# Patient Record
Sex: FEMALE | Race: OTHER | Employment: OTHER | URBAN - METROPOLITAN AREA
[De-identification: names, ages, dates, MRNs, and addresses within clinical notes are randomized per-mention and may not be internally consistent; named-entity substitution may affect disease eponyms.]

---

## 2017-07-25 PROBLEM — I10 ESSENTIAL HYPERTENSION: Status: ACTIVE | Noted: 2017-07-25

## 2017-07-25 PROBLEM — R00.2 PALPITATION: Status: ACTIVE | Noted: 2017-07-25

## 2017-09-15 PROBLEM — I47.19 PAT (PAROXYSMAL ATRIAL TACHYCARDIA): Status: ACTIVE | Noted: 2017-09-15

## 2017-09-15 PROBLEM — I49.1 PAC (PREMATURE ATRIAL CONTRACTION): Status: ACTIVE | Noted: 2017-09-15

## 2017-09-15 PROBLEM — I47.1 PAT (PAROXYSMAL ATRIAL TACHYCARDIA) (HCC): Status: ACTIVE | Noted: 2017-09-15

## 2017-09-15 PROBLEM — I49.3 PVC (PREMATURE VENTRICULAR CONTRACTION): Status: ACTIVE | Noted: 2017-09-15

## 2017-11-27 ENCOUNTER — HOSPITAL ENCOUNTER (OUTPATIENT)
Dept: MAMMOGRAPHY | Age: 72
Discharge: HOME | End: 2017-11-27
Payer: MEDICARE

## 2017-11-27 DIAGNOSIS — Z12.39 BREAST CANCER SCREENING: ICD-10-CM

## 2017-11-27 PROCEDURE — G0202 SCR MAMMO BI INCL CAD: HCPCS

## 2018-03-09 PROBLEM — R00.2 PALPITATION: Chronic | Status: ACTIVE | Noted: 2017-07-25

## 2018-03-09 PROBLEM — I47.1 PAT (PAROXYSMAL ATRIAL TACHYCARDIA) (HCC): Chronic | Status: ACTIVE | Noted: 2017-09-15

## 2018-03-09 PROBLEM — I10 ESSENTIAL HYPERTENSION: Chronic | Status: ACTIVE | Noted: 2017-07-25

## 2018-03-09 PROBLEM — I47.19 PAT (PAROXYSMAL ATRIAL TACHYCARDIA): Chronic | Status: ACTIVE | Noted: 2017-09-15

## 2019-01-18 ENCOUNTER — INITIAL CONSULT (OUTPATIENT)
Dept: ONCOLOGY | Age: 74
End: 2019-01-18
Payer: MEDICARE

## 2019-01-18 ENCOUNTER — HOSPITAL ENCOUNTER (OUTPATIENT)
Age: 74
Discharge: HOME | End: 2019-01-18
Payer: MEDICARE

## 2019-01-18 VITALS
TEMPERATURE: 97.5 F | WEIGHT: 192 LBS | DIASTOLIC BLOOD PRESSURE: 91 MMHG | BODY MASS INDEX: 35.33 KG/M2 | HEART RATE: 81 BPM | SYSTOLIC BLOOD PRESSURE: 140 MMHG | HEIGHT: 62 IN

## 2019-01-18 DIAGNOSIS — D72.829 LEUKOCYTOSIS, UNSPECIFIED TYPE: Primary | ICD-10-CM

## 2019-01-18 DIAGNOSIS — D72.829 LEUKOCYTOSIS, UNSPECIFIED TYPE: ICD-10-CM

## 2019-01-18 LAB
ABSOLUTE EOS #: 0.1 K/UL (ref 0–0.4)
ABSOLUTE IMMATURE GRANULOCYTE: ABNORMAL K/UL (ref 0–0.3)
ABSOLUTE LYMPH #: 2.5 K/UL (ref 1–4.8)
ABSOLUTE MONO #: 0.8 K/UL (ref 0.1–1.2)
BASOPHILS # BLD: 1 % (ref 0–2)
BASOPHILS ABSOLUTE: 0.1 K/UL (ref 0–0.2)
DIFFERENTIAL TYPE: ABNORMAL
EOSINOPHILS RELATIVE PERCENT: 1 % (ref 1–4)
HCT VFR BLD CALC: 42.8 % (ref 36–46)
HEMOGLOBIN: 13.9 G/DL (ref 12–16)
IMMATURE GRANULOCYTES: ABNORMAL %
LACTATE DEHYDROGENASE: 211 U/L (ref 135–214)
LYMPHOCYTES # BLD: 18 % (ref 24–44)
MCH RBC QN AUTO: 29.3 PG (ref 26–34)
MCHC RBC AUTO-ENTMCNC: 32.6 G/DL (ref 31–37)
MCV RBC AUTO: 90.1 FL (ref 80–100)
MONOCYTES # BLD: 6 % (ref 2–11)
NRBC AUTOMATED: ABNORMAL PER 100 WBC
PDW BLD-RTO: 13.4 % (ref 12.5–15.4)
PLATELET # BLD: 282 K/UL (ref 140–450)
PLATELET ESTIMATE: ABNORMAL
PMV BLD AUTO: 9.8 FL (ref 6–12)
RBC # BLD: 4.76 M/UL (ref 4–5.2)
RBC # BLD: ABNORMAL 10*6/UL
SEG NEUTROPHILS: 74 % (ref 36–66)
SEGMENTED NEUTROPHILS ABSOLUTE COUNT: 10.5 K/UL (ref 1.8–7.7)
WBC # BLD: 14.1 K/UL (ref 3.5–11)
WBC # BLD: ABNORMAL 10*3/UL

## 2019-01-18 PROCEDURE — 99201 HC NEW PT, E/M LEVEL 1: CPT | Performed by: INTERNAL MEDICINE

## 2019-01-18 PROCEDURE — 85025 COMPLETE CBC W/AUTO DIFF WBC: CPT

## 2019-01-18 PROCEDURE — 86038 ANTINUCLEAR ANTIBODIES: CPT

## 2019-01-18 PROCEDURE — 81270 JAK2 GENE: CPT

## 2019-01-18 PROCEDURE — 36415 COLL VENOUS BLD VENIPUNCTURE: CPT

## 2019-01-18 PROCEDURE — 81206 BCR/ABL1 GENE MAJOR BP: CPT

## 2019-01-18 PROCEDURE — 99204 OFFICE O/P NEW MOD 45 MIN: CPT | Performed by: INTERNAL MEDICINE

## 2019-01-18 PROCEDURE — 83615 LACTATE (LD) (LDH) ENZYME: CPT

## 2019-01-21 LAB — ANTI-NUCLEAR ANTIBODY (ANA): NEGATIVE

## 2019-01-23 LAB — V617F MUTATION, QNT: 0 %

## 2019-01-25 LAB
BCR-ABL QUANTITATIVE: NOT DETECTED
BCR-ABL1, MAJOR, RATIO: 0
BCR-ABL1, PERCENT: 0 %
BCR/ABL SOURCE: NORMAL
EER BCR-ABL1, MAJOR: NORMAL

## 2019-02-15 ENCOUNTER — TELEPHONE (OUTPATIENT)
Dept: ONCOLOGY | Age: 74
End: 2019-02-15

## 2019-02-15 ENCOUNTER — OFFICE VISIT (OUTPATIENT)
Dept: ONCOLOGY | Age: 74
End: 2019-02-15
Payer: MEDICARE

## 2019-02-15 VITALS
SYSTOLIC BLOOD PRESSURE: 153 MMHG | TEMPERATURE: 97.6 F | DIASTOLIC BLOOD PRESSURE: 83 MMHG | HEART RATE: 83 BPM | WEIGHT: 191.8 LBS | BODY MASS INDEX: 35.08 KG/M2 | RESPIRATION RATE: 18 BRPM

## 2019-02-15 DIAGNOSIS — D72.825 BANDEMIA: Primary | ICD-10-CM

## 2019-02-15 PROCEDURE — 99214 OFFICE O/P EST MOD 30 MIN: CPT | Performed by: INTERNAL MEDICINE

## 2019-08-23 ENCOUNTER — TELEPHONE (OUTPATIENT)
Dept: ONCOLOGY | Age: 74
End: 2019-08-23

## 2019-08-23 ENCOUNTER — HOSPITAL ENCOUNTER (OUTPATIENT)
Age: 74
Discharge: HOME | End: 2019-08-23
Payer: MEDICARE

## 2019-08-23 ENCOUNTER — OFFICE VISIT (OUTPATIENT)
Dept: ONCOLOGY | Age: 74
End: 2019-08-23
Payer: MEDICARE

## 2019-08-23 VITALS
BODY MASS INDEX: 33.91 KG/M2 | DIASTOLIC BLOOD PRESSURE: 85 MMHG | HEART RATE: 90 BPM | SYSTOLIC BLOOD PRESSURE: 146 MMHG | WEIGHT: 185.4 LBS

## 2019-08-23 DIAGNOSIS — D72.825 BANDEMIA: Primary | ICD-10-CM

## 2019-08-23 DIAGNOSIS — D72.825 BANDEMIA: ICD-10-CM

## 2019-08-23 LAB
ABSOLUTE EOS #: 0.2 K/UL (ref 0–0.4)
ABSOLUTE IMMATURE GRANULOCYTE: ABNORMAL K/UL (ref 0–0.3)
ABSOLUTE LYMPH #: 3.9 K/UL (ref 1–4.8)
ABSOLUTE MONO #: 0.9 K/UL (ref 0.1–1.2)
BASOPHILS # BLD: 1 % (ref 0–2)
BASOPHILS ABSOLUTE: 0.1 K/UL (ref 0–0.2)
DIFFERENTIAL TYPE: ABNORMAL
EOSINOPHILS RELATIVE PERCENT: 2 % (ref 1–4)
HCT VFR BLD CALC: 41.6 % (ref 36–46)
HEMOGLOBIN: 14 G/DL (ref 12–16)
IMMATURE GRANULOCYTES: ABNORMAL %
LYMPHOCYTES # BLD: 33 % (ref 24–44)
MCH RBC QN AUTO: 30.2 PG (ref 26–34)
MCHC RBC AUTO-ENTMCNC: 33.8 G/DL (ref 31–37)
MCV RBC AUTO: 89.5 FL (ref 80–100)
MONOCYTES # BLD: 7 % (ref 2–11)
NRBC AUTOMATED: ABNORMAL PER 100 WBC
PDW BLD-RTO: 14.8 % (ref 12.5–15.4)
PLATELET # BLD: 245 K/UL (ref 140–450)
PLATELET ESTIMATE: ABNORMAL
PMV BLD AUTO: 9.8 FL (ref 6–12)
RBC # BLD: 4.64 M/UL (ref 4–5.2)
RBC # BLD: ABNORMAL 10*6/UL
SEG NEUTROPHILS: 57 % (ref 36–66)
SEGMENTED NEUTROPHILS ABSOLUTE COUNT: 6.8 K/UL (ref 1.8–7.7)
WBC # BLD: 11.9 K/UL (ref 3.5–11)
WBC # BLD: ABNORMAL 10*3/UL

## 2019-08-23 PROCEDURE — 99211 OFF/OP EST MAY X REQ PHY/QHP: CPT

## 2019-08-23 PROCEDURE — 85025 COMPLETE CBC W/AUTO DIFF WBC: CPT

## 2019-08-23 PROCEDURE — 36415 COLL VENOUS BLD VENIPUNCTURE: CPT

## 2019-08-23 PROCEDURE — 99214 OFFICE O/P EST MOD 30 MIN: CPT | Performed by: INTERNAL MEDICINE

## 2019-08-23 RX ORDER — ESTRADIOL 0.5 MG/1
TABLET ORAL
COMMUNITY

## 2019-08-24 NOTE — PROGRESS NOTES
zolpidem, and acetaminophen-codeine. ALLERGIES:  is allergic to nsaids and penicillins. FAMILY HISTORY: Aunt had breast cancer and colon cancer. Otherwise negative for any hematological or oncological conditions. SOCIAL HISTORY:  reports that she has never smoked. She has never used smokeless tobacco. She reports that she drinks alcohol. She reports that she does not use drugs. REVIEW OF SYSTEMS:     · General: No weakness or fatigue. No unanticipated weight loss or decreased appetite. No fever or chills. · Eyes: No blurred vision, eye pain or double vision. · Ears: No hearing problems or drainage. No tinnitus. · Throat: No sore throat, problems with swallowing or dysphagia. · Respiratory: No cough, sputum or hemoptysis. No shortness of breath. No pleuritic chest pain. · Cardiovascular: No chest pain, orthopnea or PND. No lower extremity edema. No palpitation. · Gastrointestinal: No problems with swallowing. No abdominal pain or bloating. No nausea or vomiting. No diarrhea or constipation. No GI bleeding. · Genitourinary: No dysuria, hematuria, frequency or urgency. · Musculoskeletal: Positive for arthralgia. No limitation of movement. No back pain. No gait disturbance,   · Dermatologic: No skin rashes or pruritus. No skin lesions or discolorations. · Psychiatric: No depression, anxiety, or stress or signs of schizophrenia. No change in mood or affect. · Hematologic: No history of bleeding tendency. No bruises or ecchymosis. No history of clotting problems. · Infectious disease: No fever, chills or frequent infections. · Endocrine: No problems with opacity. No polydipsia or polyuria. No temperature intolerance. · Neurologic: No headaches or dizziness. No weakness or numbness of the extremities. No changes in balance, coordination,  memory, mentation, behavior. · Allergic/Immunologic: No nasal congestion or hives. No repeated infections.        PHYSICAL EXAM:  The patient is not in acute distress. Vital signs: Blood pressure (!) 146/85, pulse 90, weight 185 lb 6.4 oz (84.1 kg). HEENT:  Eyes are normal. Ears, nose and throat are normal.  Neck: Supple. No lymph node enlargement. No thyroid enlargement. Trachea is centrally located. Chest:  Clear to auscultation. No wheezes or crepitations. Heart: Regular sinus rhythm. Abdomen: Soft, nontender. No hepatosplenomegaly. No masses. Extremities:  With no edema. Lymph Nodes:  No cervical, axillary or inguinal lymph node enlargement. Neurologic:  Conscious and oriented. No focal neurological deficits. Psychosocial: No depression, anxiety or stress. Skin: No rashes, bruises or ecchymoses. Review of Diagnostic data:   Lab Results   Component Value Date    WBC 11.9 (H) 08/23/2019    HGB 14.0 08/23/2019    HCT 41.6 08/23/2019    MCV 89.5 08/23/2019     08/23/2019    LYMPHOPCT 33 08/23/2019    RBC 4.64 08/23/2019    MCH 30.2 08/23/2019    MCHC 33.8 08/23/2019    RDW 14.8 08/23/2019             IMPRESSION:   Persistent leukocytosis, Reactive leukocytosis. past history of tobacco abuse    PLAN: I reviewed the labs as above and discussed with the patient. I explained to the patient the nature of this hematologic problem. I explained the significance of these abnormalities in layman language. Patient had persistent mild leukocytosis for several years. We had further workup including LDH, JAK2 Gene mutation, flow cytometry and BCR/ABL. Results were all negative. Likely bleeding with reactive leukocytosis. All explained to the patient. At the present time no need for bone marrow test.  Recommendations for monitoring. Labs will be repeated in 6 months. Patient's questions were answered to the best of her satisfaction and she verbalized full understanding and agreement.

## 2020-02-28 ENCOUNTER — OFFICE VISIT (OUTPATIENT)
Dept: ONCOLOGY | Age: 75
End: 2020-02-28
Payer: MEDICARE

## 2020-02-28 ENCOUNTER — TELEPHONE (OUTPATIENT)
Dept: ONCOLOGY | Age: 75
End: 2020-02-28

## 2020-02-28 ENCOUNTER — HOSPITAL ENCOUNTER (OUTPATIENT)
Age: 75
Discharge: HOME | End: 2020-02-28
Payer: MEDICARE

## 2020-02-28 VITALS
HEART RATE: 98 BPM | SYSTOLIC BLOOD PRESSURE: 142 MMHG | RESPIRATION RATE: 18 BRPM | DIASTOLIC BLOOD PRESSURE: 81 MMHG | BODY MASS INDEX: 33.8 KG/M2 | WEIGHT: 190.8 LBS | TEMPERATURE: 98.2 F

## 2020-02-28 DIAGNOSIS — D72.825 BANDEMIA: ICD-10-CM

## 2020-02-28 LAB
ABSOLUTE EOS #: 0.3 K/UL (ref 0–0.4)
ABSOLUTE IMMATURE GRANULOCYTE: ABNORMAL K/UL (ref 0–0.3)
ABSOLUTE LYMPH #: 2.2 K/UL (ref 1–4.8)
ABSOLUTE MONO #: 0.6 K/UL (ref 0.1–1.2)
BASOPHILS # BLD: 1 % (ref 0–2)
BASOPHILS ABSOLUTE: 0.1 K/UL (ref 0–0.2)
DIFFERENTIAL TYPE: ABNORMAL
EOSINOPHILS RELATIVE PERCENT: 3 % (ref 1–4)
HCT VFR BLD CALC: 39.6 % (ref 36–46)
HEMOGLOBIN: 13 G/DL (ref 12–16)
IMMATURE GRANULOCYTES: ABNORMAL %
LYMPHOCYTES # BLD: 23 % (ref 24–44)
MCH RBC QN AUTO: 29.5 PG (ref 26–34)
MCHC RBC AUTO-ENTMCNC: 32.8 G/DL (ref 31–37)
MCV RBC AUTO: 89.9 FL (ref 80–100)
MONOCYTES # BLD: 6 % (ref 2–11)
NRBC AUTOMATED: ABNORMAL PER 100 WBC
PDW BLD-RTO: 15.1 % (ref 12.5–15.4)
PLATELET # BLD: 232 K/UL (ref 140–450)
PLATELET ESTIMATE: ABNORMAL
PMV BLD AUTO: 9 FL (ref 6–12)
RBC # BLD: 4.4 M/UL (ref 4–5.2)
RBC # BLD: ABNORMAL 10*6/UL
SEG NEUTROPHILS: 67 % (ref 36–66)
SEGMENTED NEUTROPHILS ABSOLUTE COUNT: 6.5 K/UL (ref 1.8–7.7)
WBC # BLD: 9.6 K/UL (ref 3.5–11)
WBC # BLD: ABNORMAL 10*3/UL

## 2020-02-28 PROCEDURE — 36415 COLL VENOUS BLD VENIPUNCTURE: CPT

## 2020-02-28 PROCEDURE — 99213 OFFICE O/P EST LOW 20 MIN: CPT | Performed by: INTERNAL MEDICINE

## 2020-02-28 PROCEDURE — 99211 OFF/OP EST MAY X REQ PHY/QHP: CPT | Performed by: INTERNAL MEDICINE

## 2020-02-28 PROCEDURE — 85025 COMPLETE CBC W/AUTO DIFF WBC: CPT

## 2020-02-29 NOTE — PROGRESS NOTES
patient is not in acute distress. Vital signs: Blood pressure (!) 142/81, pulse 98, temperature 98.2 °F (36.8 °C), temperature source Oral, resp. rate 18, weight 190 lb 12.8 oz (86.5 kg). HEENT:  Eyes are normal. Ears, nose and throat are normal.  Neck: Supple. No lymph node enlargement. No thyroid enlargement. Trachea is centrally located. Chest:  Clear to auscultation. No wheezes or crepitations. Heart: Regular sinus rhythm. Abdomen: Soft, nontender. No hepatosplenomegaly. No masses. Extremities:  With no edema. Lymph Nodes:  No cervical, axillary or inguinal lymph node enlargement. Neurologic:  Conscious and oriented. No focal neurological deficits. Psychosocial: No depression, anxiety or stress. Skin: No rashes, bruises or ecchymoses. Review of Diagnostic data:   Lab Results   Component Value Date    WBC 9.6 02/28/2020    HGB 13.0 02/28/2020    HCT 39.6 02/28/2020    MCV 89.9 02/28/2020     02/28/2020    LYMPHOPCT 23 (L) 02/28/2020    RBC 4.40 02/28/2020    MCH 29.5 02/28/2020    MCHC 32.8 02/28/2020    RDW 15.1 02/28/2020             IMPRESSION:   Persistent leukocytosis, Reactive leukocytosis. past history of tobacco abuse    PLAN: I reviewed the labs as above and discussed with the patient. I explained to the patient the nature of this hematologic problem. I explained the significance of these abnormalities in layman language. Patient had persistent mild leukocytosis for several years. We had further workup including LDH, JAK2 Gene mutation, flow cytometry and BCR/ABL. Results were all negative. Likely dealing with reactive leukocytosis. All explained to the patient. Repeated CBC showed normal WBCs. RV PRN  Patient's questions were answered to the best of her satisfaction and she verbalized full understanding and agreement.

## 2020-05-18 ENCOUNTER — HOSPITAL ENCOUNTER (EMERGENCY)
Age: 75
Discharge: HOME | End: 2020-05-18
Attending: EMERGENCY MEDICINE
Payer: MEDICARE

## 2020-05-18 VITALS
OXYGEN SATURATION: 94 % | HEART RATE: 79 BPM | RESPIRATION RATE: 16 BRPM | HEIGHT: 64 IN | TEMPERATURE: 98.3 F | WEIGHT: 180 LBS | BODY MASS INDEX: 30.73 KG/M2 | DIASTOLIC BLOOD PRESSURE: 60 MMHG | SYSTOLIC BLOOD PRESSURE: 141 MMHG

## 2020-05-18 PROCEDURE — 99282 EMERGENCY DEPT VISIT SF MDM: CPT

## 2020-05-18 PROCEDURE — 12011 RPR F/E/E/N/L/M 2.5 CM/<: CPT

## 2020-05-18 PROCEDURE — 90471 IMMUNIZATION ADMIN: CPT | Performed by: EMERGENCY MEDICINE

## 2020-05-18 PROCEDURE — 2500000003 HC RX 250 WO HCPCS: Performed by: EMERGENCY MEDICINE

## 2020-05-18 PROCEDURE — 90715 TDAP VACCINE 7 YRS/> IM: CPT | Performed by: EMERGENCY MEDICINE

## 2020-05-18 PROCEDURE — 6360000002 HC RX W HCPCS: Performed by: EMERGENCY MEDICINE

## 2020-05-18 RX ORDER — LIDOCAINE HYDROCHLORIDE 10 MG/ML
20 INJECTION, SOLUTION INFILTRATION; PERINEURAL ONCE
Status: COMPLETED | OUTPATIENT
Start: 2020-05-18 | End: 2020-05-18

## 2020-05-18 RX ORDER — CEPHALEXIN 500 MG/1
500 CAPSULE ORAL 4 TIMES DAILY
Qty: 28 CAPSULE | Refills: 0 | Status: SHIPPED | OUTPATIENT
Start: 2020-05-18 | End: 2020-05-25

## 2020-05-18 RX ADMIN — LIDOCAINE HYDROCHLORIDE 20 ML: 10 INJECTION, SOLUTION INFILTRATION; PERINEURAL at 11:20

## 2020-05-18 RX ADMIN — TETANUS TOXOID, REDUCED DIPHTHERIA TOXOID AND ACELLULAR PERTUSSIS VACCINE, ADSORBED 0.5 ML: 5; 2.5; 8; 8; 2.5 SUSPENSION INTRAMUSCULAR at 11:20

## 2020-05-18 ASSESSMENT — PAIN SCALES - GENERAL: PAINLEVEL_OUTOF10: 0

## 2020-05-18 NOTE — ED PROVIDER NOTES
moist.  1 cm to ala of left nostril, involving the cartilage. No active bleeding. No septal hematoma. Neck is supple with no pain or step-off. Heart sounds regular rate and rhythm with no gallops, murmurs, or rubs. Lungs clear, no wheezes, rales or rhonchi. Abdomen: soft, nontender with no pain to palpation. Musculoskeletal exam shows no evidence of trauma, other than to face. Normal distal pulses in all extremities. Skin: no rash or edema. Neurological exam reveals cranial nerves 2 through 12 grossly intact. Patient has equal  and normal deep tendon reflexes. Psychiatric: no hallucinations or suicidal ideation. Lymphatics.:  No lymphadenopathy. DIFFERENTIAL DIAGNOSIS/ MDM:     laceration    DIAGNOSTIC RESULTS         EMERGENCY DEPARTMENT COURSE:   Vitals:    Vitals:    05/18/20 1114   BP: (!) 141/60   Pulse: 79   Resp: 16   Temp: 98.3 °F (36.8 °C)   TempSrc: Oral   SpO2: 94%   Weight: 81.6 kg (180 lb)   Height: 5' 4\" (1.626 m)     -------------------------  BP: (!) 141/60, Temp: 98.3 °F (36.8 °C), Pulse: 79, Resp: 16      Re-evaluation Notes    The patient received repair of her wound. I will write for Keflex which she has tolerated in the past since this is through the cartilage. She can follow-up with her doctor. If the sutures do not dissolve on their own, she should return for suture removal.  She is discharged in good condition. PROCEDURES:    Laceration repair: The patient was anesthetized with local instillation of 1% lidocaine without epinephrine. The patient's wound was cleansed with saline and chlorhexidine. The ala is involved and cartilage is lacerated. The wound was repaired with sutures to the skin. 5 6-0 Vicryl sutures were used. FINAL IMPRESSION      1.  Laceration of nose, initial encounter          DISPOSITION/PLAN   DISPOSITION Decision To Discharge 05/18/2020 11:41:14 AM      Condition on Disposition    good    PATIENT REFERRED TO:  Ana Cristina Hook [address]  [address]  [facility] [place] [address]  [phone]    In 1 week  As needed      DISCHARGE MEDICATIONS:  New Prescriptions    CEPHALEXIN (KEFLEX) 500 MG CAPSULE    Take 1 capsule by mouth 4 times daily for 7 days       (Please note that portions of this note were completed with a voice recognition program.  Efforts were made to edit the dictations but occasionally words are mis-transcribed.)    Zuñiga MD   Attending Emergency Physician         Phi Carty MD  05/18/20 4129

## 2020-07-10 ENCOUNTER — HOSPITAL ENCOUNTER (OUTPATIENT)
Dept: MRI IMAGING | Age: 75
Discharge: HOME | End: 2020-07-12
Payer: MEDICARE

## 2020-07-10 PROCEDURE — 73721 MRI JNT OF LWR EXTRE W/O DYE: CPT

## 2020-11-16 ENCOUNTER — HOSPITAL ENCOUNTER (OUTPATIENT)
Dept: PHYSICAL THERAPY | Facility: CLINIC | Age: 75
Setting detail: THERAPIES SERIES
Discharge: HOME | End: 2020-11-16
Payer: MEDICARE

## 2020-11-16 PROCEDURE — 97162 PT EVAL MOD COMPLEX 30 MIN: CPT

## 2020-11-16 PROCEDURE — 97110 THERAPEUTIC EXERCISES: CPT

## 2020-11-16 NOTE — FLOWSHEET NOTE
[x] [facility]. [facility]      [facility]    [address]     Phone: [phone]     Fax:  [phone]     Physical Therapy Evaluation    Date:  2020  Patient: Jennifer Souza  : 1945  MRN: [id number]  Physician: eMredith 63: Rebecca Swanson              Eligibility Status:  Eligible     DOS: 2020  # of visits allowed/remaining: based on med necessity   Source: Website  Spoke To:  [employer]   Reference: [id number]  Medical Diagnosis: S/P R TKA   Rehab Codes: M17.11  Onset Date: 9/3/20                                   Subjective:  Pt reports pain of R ant/med knee region, reports increased stiffness, swelling, difficulty walking. Pt S/P R TKA w/ femoral hardware placement 9/3/20. Pt w/ long hx of knee OA, previous knee strains, contusions, previous PT w/o sig relief of pain, eventually opted for surgical replacement. Pt completed home PT, now presents to [facility] out-patient knee rehabilitation.      PMHx: [] Unremarkable [] Diabetes [] HTN  [] Pacemaker   [] MI/Heart Problems [] Cancer [] Arthritis [] Asthma                         [x] refer to full medical chart  In [facility]  [] Other:        Tests: [x] X-Ray: [] MRI:  [] Other:    Medications: [x] Refer to full medical record [] None [] Other:  Allergies:      [x] Refer to full medical record [] None [] Other:    Function:  Hand Dominance  [x] Right  [] Left  Working:  [] Normal Duty  [] Light Duty  [] Off D/T Condition  [x] Retired     [] Not Employed  []  Disability  [] Other:           Return to work:   Job/ADL Description: Amb w/ quad cane WBAT R    Pain:  [x] Yes  [] No Pain Rating: (0-10 scale) 3/10  Pain altered Tx:  [] Yes  [x] No  Action:    Symptoms:  [] Improving [] Worsening [x] Same    Objective:   L/R   ROM  ° A/P STRENGTH    Hip Flex WNL WNL 4+ 4-    Ext WNL WNL 4+ 4-    Knee Flex WNL WNL 4+ 4-    Ext WNL WNL 4+ 4-      OBSERVATION No Deficit Deficit Not Tested Comments   Palpation [] [x] Sensation [x] []     Edema [] [x]  Med knee     FUNCTION Normal Difficult Unable   walking [] [x] []   Up/dn steps [] [x] []   Squatting [] [x] []   bending [] [x] []     ASSESSMENT   Pt limited by R knee pain, swelling, ROM loss, functional weakness S/P R TKA. PROBLEMS  Knee pain  Knee ROM loss  Knee weakness  Knee functional deficits    SHORT TERM GOALS ( 8 visits)  Knee pain = 0  Knee ROM = WNL  Knee strength = 4+/5  Knee function: bend, walk, up/dn steps, squat w/o pain    LONG TERM GOALS ( 12 visits)  Independent Home Exercise program  Return to normal activity    PATIENT GOAL  Get back to normal    Rehab Potential:  [x] Good  [] Fair  [] Poor   Suggested Professional Referral:  [x] No  [] Yes:  Barriers to Goal Achievement[de-identified]  [x] No  [] Yes:  Domestic Concerns:  [x] No  [] Yes:    Pt. Education:  [x] Plans/Goals, Risks/Benefits discussed  [x] Home exercise program  Method of Education: [x] Verbal  [x] Demo  [x] Written  Comprehension of Education:  [x] Verbalizes understanding. [x] Demonstrates understanding. [] Needs Review. [] Demonstrates/verbalizes understanding of HEP/Ed previously given.     Treatment Plan:  [x] Therapeutic Exercise    [] Modalities:  [] Therapeutic Activity    [] Ultrasound  [] Electrical Stimulation  [] Gait Training     [] Massage       [] Lumbar/Cervical Traction  [] Neuromuscular Re-education [x] Cold/hotpack [] Instruction in HEP  [] Manual Therapy   [] Aquatic Therapy [] Other:     [] Iontophoresis: 4 mg/mL Dexamethasone Sodium Phosphate 40-80 mAmin  [] Drug allergies reviewed    _______Initials           _______Date     Frequency:  2 x/week for 12 visits    Todays Treatment:     11/16/2020 Visit #1    Exercise Reps/ Time Weight/ Level Comments   Nustep 10 min     Stretch Gastroc 3x30s     Stretch knee ext/flex 3x30s stool    SKE 3x10 purple    Calf raises 3x10     Balance board taps 3x10 M/L    Sit-stand mini squats 3x10         Specific Instructions for next treatment:    Treatment Charges: Mins Units   [x] Evaluation ----- 1   []  Modalities     [x]  Ther Exercise 20 1   []  Manual Therapy     []  Ther Activities     []  Aquatics     []  Other       Time in: 1500     Time out: 1600    Electronically signed by: Isidoro Bentley PT        Physician Signature:________________________________Date:__________________  By signing above, I have reviewed this plan of care and certify a need for medically   necessary rehabilitation services.      *PLEASE SIGN ABOVE AND FAX BACK ALL PAGES*

## 2020-11-20 ENCOUNTER — HOSPITAL ENCOUNTER (OUTPATIENT)
Dept: PHYSICAL THERAPY | Facility: CLINIC | Age: 75
Setting detail: THERAPIES SERIES
Discharge: HOME | End: 2020-11-20
Payer: MEDICARE

## 2020-11-20 PROCEDURE — 97110 THERAPEUTIC EXERCISES: CPT

## 2020-11-20 NOTE — FLOWSHEET NOTE
[x] [address]    Outpatient Rehabilitation &  Therapy  [address] [place] [address]  P: [phone]  F: [phone]     Physical Therapy Daily Treatment Note    Date:  2020  Patient Name:  Roma Singh    :  1945  MRN: [id number]  Physician: Josh Dauphin Street: Kasi Limb MEDICARE              Eligibility Status: Tabatha Mcgrath  DOS: 2020  # of visits allowed/remaining: based on med necessity   Source: Website  Spoke To:  [employer]   Reference: [id number]  Medical Diagnosis: S/P R TKA          Rehab Codes: M17.11  Onset Date: 9/3/20              Visit# / total visits: 2     Cancels/No Shows: 0    Subjective:    Pain:  [x] Yes  [] No Location: R knee  Pain Rating: (0-10 scale) 0-1/10  Pain altered Tx:  [x] No  [] Yes  Action:  Comments: Pt arrived with a quad cane. Pt stated that she seen a plastic surgeon and they determined that the lump on her medial aspect of her knee is just tissue and it cannot be surgically     Objective:  Modalities:   Precautions:  Exercises:  Exercise Reps/ Time Weight/ Level Comments   Nustep 10 min       Stretch Gastroc 3x30s       Stretch knee ext/flex 3x30s stool     SKE 20x5\" purple     Calf raises 2x10       Balance board taps 3x10 M/L  L 1   Sit-stand mini squats 2x10   Blue foam in seat         Instructions for subsequent visits:      Treatment Charges: Mins Units   []  Modalities     [x]  Ther Exercise 30 2   []  Manual Therapy     []  Ther Activities     []  Aquatics     []  Vasocompression     []  Other     Total Treatment time 30  2       Assessment: [x] Progressing toward goals. [] No change. [x] Other: Educated and worked on gait but pt fearful of falling, offered pt my arm for UE support and just with light touch pt was able to ambulate better. Worked on sit to stand but had to modify with blue foam for elevated surface to aide in standing. Continued with standing exercises in // bars.    SHORT TERM GOALS ( 8 visits)  Knee pain = 0  Knee ROM = WNL  Knee strength = 4+/5  Knee function: bend, walk, up/dn steps, squat w/o pain     LONG TERM GOALS ( 12 visits)  Independent Home Exercise program  Return to normal activity     PATIENT GOAL  Get back to normal      Pt. Education:  [x] Yes  [] No  [x] Reviewed Prior HEP/Ed  Method of Education: [x] Verbal  [x] Demo  [] Written  Comprehension of Education:  [x] Verbalizes understanding. [x] Demonstrates understanding. [] Needs review. [] Demonstrates/verbalizes HEP/Ed previously given. Plan: [x] Continue with current plan of care towards goals.         Time In:3:30pm            Time Out:  4:20pm    Electronically signed by:  Azael Figueroa PTA

## 2020-11-24 ENCOUNTER — APPOINTMENT (OUTPATIENT)
Dept: PHYSICAL THERAPY | Facility: CLINIC | Age: 75
End: 2020-11-24
Payer: MEDICARE

## 2020-11-30 ENCOUNTER — HOSPITAL ENCOUNTER (OUTPATIENT)
Dept: PHYSICAL THERAPY | Facility: CLINIC | Age: 75
Setting detail: THERAPIES SERIES
Discharge: HOME | End: 2020-11-30
Payer: MEDICARE

## 2020-11-30 PROCEDURE — 97110 THERAPEUTIC EXERCISES: CPT

## 2020-11-30 NOTE — FLOWSHEET NOTE
[x] [address]   Outpatient Rehabilitation &  Therapy  [address] [facility]  P: [phone]  F: [phone]     Physical Therapy Daily Treatment Note    Date:  2020  Patient Name:  Roma Singh    :  1945  MRN: [id number]  Physician: Josh [address]born Street: Amarjit Hollis              Eligibility Status: [facility]  DOS: 2020  # of visits allowed/remaining: based on med necessity   Source: Website  Spoke To:  [employer]   Reference: [id number]  Medical Diagnosis: S/P R TKA          Rehab Codes: M17.11  Onset Date: 9/3/20              Visit# / total visits: 3     Cancels/No Shows: 0    Subjective:  Pt reports cont knee stiffness, difficulty bending d/t soft tissue mass on med side, states she is scheduled for consult w/ plastic surgeon. States she has been using walker more than quad cane as she does not feel as comfortable w/ quad cane  Pain:  [x] Yes  [] No Location: R knee  Pain Rating: (0-10 scale) 2/10  Pain altered Tx:  [x] No  [] Yes  Action:  Comments:     Objective:  Modalities:   Precautions:  Exercises:  Exercise Reps/ Time Weight/ Level Comments   Nustep 10 min       Stretch Gastroc 3x30s       Stretch knee ext/flex 3x30s stool     heelslides 2x10 2#    SAQ 2x10 2#    Bridges 2x10     Tband hooklying abd 2x10 grn    SLR 2x10 2#     TG B squat, calf raise 2x10  L14     Balance board taps 3x10 M/L  L 1   SKE 2x10  purple          Instructions for subsequent visits:      Treatment Charges: Mins Units   []  Modalities     [x]  Ther Exercise 45 3   []  Manual Therapy     []  Ther Activities     []  Aquatics     []  Vasocompression     []  Other     Total Treatment time 45 3       Assessment: [x] Progressing toward goals. [] No change. [x] Other: Pt given instruction is str cane amb as she tends to be over-relaint on walker or holding onto clinician's arm for support.     SHORT TERM GOALS ( 8 visits)  Knee pain = 0  Knee ROM = WNL  Knee strength = 4+/5  Knee function: bend, walk, up/dn steps, squat w/o pain     LONG TERM GOALS ( 12 visits)  Independent Home Exercise program  Return to normal activity     PATIENT GOAL  Get back to normal      Pt. Education:  [x] Yes  [] No  [x] Reviewed Prior HEP/Ed  Method of Education: [x] Verbal  [x] Demo  [] Written  Comprehension of Education:  [x] Verbalizes understanding. [x] Demonstrates understanding. [] Needs review. [] Demonstrates/verbalizes HEP/Ed previously given. Plan: [x] Continue with current plan of care towards goals.         Time In:3:30pm            Time Out:  4:20pm    Electronically signed by:  Hadley Garcia PT

## 2020-12-04 ENCOUNTER — HOSPITAL ENCOUNTER (OUTPATIENT)
Dept: PHYSICAL THERAPY | Facility: CLINIC | Age: 75
Setting detail: THERAPIES SERIES
Discharge: HOME | End: 2020-12-04
Payer: MEDICARE

## 2020-12-04 PROCEDURE — 97110 THERAPEUTIC EXERCISES: CPT

## 2020-12-04 NOTE — FLOWSHEET NOTE
[x] [address]    Outpatient Rehabilitation &  Therapy  [address] [facility]  P: [phone]  F: [phone]     Physical Therapy Daily Treatment Note    Date:  2020  Patient Name:  Chanell Jones    :  1945  MRN: [id number]  Physician: [address]: Yin Reese MEDICARE              Eligibility Status: Jarvis Aquino  DOS: 2020  # of visits allowed/remaining: based on med necessity   Source: Website  Spoke To:  [employer]   Reference: [id number]  Medical Diagnosis: S/P R TKA          Rehab Codes: M17.11  Onset Date: 9/3/20              Visit# / total visits:      Cancels/No Shows: 0    Subjective: Pain:  [x] Yes  [] No Location: R knee  Pain Rating: (0-10 scale) 410  Pain altered Tx:  [x] No  [] Yes  Action:  Comments: States she feels good after leaving therapy. Has consult with plastic surgeon regarding lump in February. States she doesn't feel stable when ambulating SPC. Arrives to therapy with walker and cane. Objective:  Modalities:   Precautions:  Exercises:  Exercise Reps/ Time Weight/ Level Comments   Nustep 10 min       Stretch Gastroc 3x30s       Stretch knee ext/flex 3x30s stool     heelslides 2x10 2#    SAQ 2x10 2#    Bridges 2x10     Tband hooklying abd 2x10 grn    SLR 2x10 2#     TG B squat, calf raise 2x10  L14     Balance board taps 3x10 M/L  L2   SKE 2x10  purple    Step Ups 2x10 4\"    3 way hip CKC x15 No band          Other: Gait training with [facility]     Instructions for subsequent visits:      Treatment Charges: Mins Units   []  Modalities     [x]  Ther Exercise 50 3   []  Manual Therapy     []  Ther Activities     []  Aquatics     []  Vasocompression     []  Other     Total Treatment time 50 3       Assessment: [x] Progressing toward goals. Extended time spent training with [facility]. Cane needed adjusted to pt's height. Cues needed to keep cane close to body and to keep eyes forward.  Pt has fear of falling and needs encouragement to trust her RLE. Added step ups to standing program with good tolerance. Cont to progress ex program and gait train so pt is more confident with ambulation. [] No change. [x] Other:      SHORT TERM GOALS ( 8 visits)  Knee pain = 0  Knee ROM = WNL  Knee strength = 4+/5  Knee function: bend, walk, up/dn steps, squat w/o pain     LONG TERM GOALS ( 12 visits)  Independent Home Exercise program  Return to normal activity     PATIENT GOAL  Get back to normal      Pt. Education:  [x] Yes  [] No  [x] Reviewed Prior HEP/Ed  Method of Education: [x] Verbal  [x] Demo  [] Written  Comprehension of Education:  [x] Verbalizes understanding. [x] Demonstrates understanding. [] Needs review. [] Demonstrates/verbalizes HEP/Ed previously given. Plan: [x] Continue with current plan of care towards goals.         Time In: 2:00 pm          Time Out:  3:00 pm    Electronically signed by:  Danilo Valdez PTA

## 2020-12-07 ENCOUNTER — HOSPITAL ENCOUNTER (OUTPATIENT)
Dept: PHYSICAL THERAPY | Facility: CLINIC | Age: 75
Setting detail: THERAPIES SERIES
Discharge: HOME | End: 2020-12-07
Payer: MEDICARE

## 2020-12-07 PROCEDURE — 97110 THERAPEUTIC EXERCISES: CPT

## 2020-12-07 NOTE — FLOWSHEET NOTE
[x] [address]    Outpatient Rehabilitation &  Therapy  [address] [place] [address]  P: [phone]  F: [phone]     Physical Therapy Daily Treatment Note    Date:  2020  Patient Name:  Ronaldo Adorno    :  1945  MRN: [id number]  Physician: Josh Hinds Street: Hadley Wood MEDICARE              Eligibility Status: María Smith  DOS: 2020  # of visits allowed/remaining: based on med necessity   Source: Website  Spoke To:  [employer]   Reference: [id number]  Medical Diagnosis: S/P R TKA          Rehab Codes: M17.11  Onset Date: 9/3/20              Visit# / total visits:      Cancels/No Shows: 0    Subjective: Pt reports she is gradually adapting to use of cane for walking w/o sig increased knee symptoms  Pain:  [x] Yes  [] No Location: R knee  Pain Rating: (0-10 scale) 210  Pain altered Tx:  [x] No  [] Yes  Action:  Comments:     Objective:  Modalities:   Precautions:  Exercises:  Exercise Reps/ Time Weight/ Level Comments   Nustep 10 min       Stretch Gastroc 3x30s       Stretch knee ext/flex 3x30s stool     heelslides 3x10 2#    SAQ 3x10 2#    Bridges 2x10     Supine hip abd 3x10 2#    SLR 2x10 2#     TG B squat, calf raise 2x10  L16     Balance board taps 3x10 M/L  L2   SKE 2x10  purple    Step Ups 2x10 4\"    3 way hip CKC x15 No band          Other: Gait training with SPC     Instructions for subsequent visits:      Treatment Charges: Mins Units   []  Modalities     [x]  Ther Exercise 45 3   []  Manual Therapy     []  Ther Activities     []  Aquatics     []  Vasocompression     []  Other     Total Treatment time 45 3       Assessment: [x] Progressing toward goals. [] No change.      [x] Other:  Pt amb w/ short stride, avoids knee flex, tendency to reach for objects for support, moves well when given arm support for all exercises, but when support removed she becomes apprehensive of falling which limits her ability to utilize the strength she does have to

## 2020-12-11 ENCOUNTER — HOSPITAL ENCOUNTER (OUTPATIENT)
Dept: PHYSICAL THERAPY | Facility: CLINIC | Age: 75
Setting detail: THERAPIES SERIES
Discharge: HOME | End: 2020-12-11
Payer: MEDICARE

## 2020-12-11 PROCEDURE — 97110 THERAPEUTIC EXERCISES: CPT

## 2020-12-11 NOTE — FLOWSHEET NOTE
[x] [address]    Outpatient Rehabilitation &  Therapy  [address] [place] [address]  P: [phone]  F: [phone]     Physical Therapy Daily Treatment Note    Date:  2020  Patient Name:  Phillip Ridley    :  1945  MRN: [id number]  Physician: [address] [facility] [address]: [address] [address] [facility]. MEDICARE              Eligibility Status: Lynne Dyer  DOS: 2020  # of visits allowed/remaining: based on med necessity   Source: Website  Spoke To:  [employer]   Reference: [id number]  Medical Diagnosis: S/P R TKA          Rehab Codes: M17.11  Onset Date: 9/3/20              Visit# / total visits:      Cancels/No Shows: 0    Subjective:   Pain:  [] Yes  [x] No Location: R knee  Pain Rating: (0-10 scale) 010  Pain altered Tx:  [x] No  [] Yes  Action:  Comments: No pain to report at arrival. Pt states she has been walking around her house with no AD. Objective:  Modalities:   Precautions:  Exercises:  Exercise Reps/ Time Weight/ Level Comments   Nustep 10 min       Stretch Gastroc 3x30s       Stretch knee ext/flex 3x30s stool     heelslides 3x10 2#    SAQ 3x10 2#    Bridges 2x10     Supine hip abd 3x10 2#    SLR 2x10 2#     TG B squat, calf raise 2x10  L16     Balance board taps 3x10 M/L  L2   SKE 2x10  purple    Step Ups 2x10 4\"    3 way hip CKC x15 No band          Other: Gait training with [employer]     Instructions for subsequent visits:      Treatment Charges: Mins Units   []  Modalities     [x]  Ther Exercise 45 3   []  Manual Therapy     []  Ther Activities     []  Aquatics     []  Vasocompression     []  Other     Total Treatment time 45 3       Assessment: [x] Progressing toward goals. Improvement with ambulation with SPC however still tends to reach for objects when they are near. Needs cues to avoid excessive UE assistance with static exercises. Instructed pt to be using AD when at home for safety. [] No change.      [] Other:      SHORT TERM GOALS ( 8 visits)  Knee pain = 0  Knee ROM = WNL  Knee strength = 4+/5  Knee function: bend, walk, up/dn steps, squat w/o pain     LONG TERM GOALS ( 12 visits)  Independent Home Exercise program  Return to normal activity     PATIENT GOAL  Get back to normal      Pt. Education:  [x] Yes  [] No  [x] Reviewed Prior HEP/Ed  Method of Education: [x] Verbal  [x] Demo  [] Written  Comprehension of Education:  [x] Verbalizes understanding. [x] Demonstrates understanding. [] Needs review. [] Demonstrates/verbalizes HEP/Ed previously given. Plan: [x] Continue with current plan of care towards goals.         Time In: 3:00 pm          Time Out:  4:00 pm    Electronically signed by:  Carina Santos PTA

## 2020-12-14 ENCOUNTER — HOSPITAL ENCOUNTER (OUTPATIENT)
Dept: PHYSICAL THERAPY | Facility: CLINIC | Age: 75
Setting detail: THERAPIES SERIES
Discharge: HOME | End: 2020-12-14
Payer: MEDICARE

## 2020-12-14 NOTE — FLOWSHEET NOTE
-- Message is from the [facility]--    Reason for Call: patient needs to go get blood work done today and wants to know if she needs to fast please give her a call to let her know    Caller Information       Type Contact Phone    02/01/2019 10:44 AM Phone (Incoming) Janina Mcginnis (Self) [phone] (H)          Alternative phone number: [phone]    Turnaround time given to caller:   \"This message will be sent to [state Provider's name]. The clinical team will fulfill your request as soon as they review your message.\"     [] [address]  [address]    [phone]  F: [phone]   [] [address]  [facility] [address]   [address]  P: [phone]  F: [phone]  [x] [address] &  Therapy  [address] [facility] [address]  P: [phone]  F: [phone] [] [address]  P: [phone]  F: [phone]  [] [address]  [address] [address] [facility] [address]   [address]   [place]: [phone]  F: [phone]   [] [address]  [place]: [phone]   F: [phone]     Physical Therapy Cancel/No Show note    Date: 2020  Patient: Eddie Kidd  : 1945  MRN: [id number]    Cancels/No Shows to date:     For today's appointment patient:    [x]  Cancelled    [] Rescheduled appointment    [] No-show     Reason given by patient:    [x]  Patient ill    []  Conflicting appointment    [] No transportation      [] Conflict with work    [] No reason given    [] Weather related    [] [id number]    [] Other:      Comments:        [x] Next appointment was confirmed    Electronically signed by: Quirino Christianson PTA

## 2020-12-18 ENCOUNTER — HOSPITAL ENCOUNTER (OUTPATIENT)
Dept: PHYSICAL THERAPY | Facility: CLINIC | Age: 75
Setting detail: THERAPIES SERIES
Discharge: HOME | End: 2020-12-18
Payer: MEDICARE

## 2020-12-18 PROCEDURE — 97110 THERAPEUTIC EXERCISES: CPT

## 2020-12-18 NOTE — FLOWSHEET NOTE
[x] [address]    Outpatient Rehabilitation &  Therapy  [address] [place] [address]  P: [phone]  F: [phone]     Physical Therapy Daily Treatment Note    Date:  2020  Patient Name:  Sammy Bautista    :  1945  MRN: [id number]  Physician: Josh Mauricio Street: Michael Lais MEDICARE              Eligibility Status: Maryann Levi  DOS: 2020  # of visits allowed/remaining: based on med necessity   Source: Website  Spoke To:  [employer]   Reference: [id number]  Medical Diagnosis: S/P R TKA          Rehab Codes: M17.11  Onset Date: 9/3/20              Visit# / total visits:      Cancels/No Shows: 0    Subjective:   Pain:  [] Yes  [x] No Location: R knee  Pain Rating: (0-10 scale) 0/10  Pain altered Tx:  [x] No  [] Yes  Action:  Comments: No pain today just achy. Pt also arrived with a RW with her cane attached. Objective:  Modalities:   Precautions:  Exercises:  Exercise: R knee Reps/ Time Weight/ Level Comments    Nustep 10 min        Stretch Gastroc 3x30s         Stretch knee ext/flex 3x30s stool             heelslides 3x10 2#     SAQ 3x10 2#     LAQ 3x10 2#     Bridges 2x10      Supine hip abd 3x10 2#     SLR 2x10 2#             TG B squat, calf raise 2x10  L16      Balance board taps 3x10 M/L  L2    SKE 20x5\" Plum      Step Ups 2x10 4\"     3 way hip CKC 3x10 No band     Marches  2x10      Other: Gait training with SPC     Instructions for subsequent visits:      Treatment Charges: Mins Units   []  Modalities     [x]  Ther Exercise 45 3   []  Manual Therapy     []  Ther Activities     []  Aquatics     []  Vasocompression     []  Other     Total Treatment time 45 3       Assessment: [x] Progressing toward goals. [] No change. [x] Other:  After warm up and stretches, all standing exercises were performed in // bars. Verbal cueing required for exercise recall and upright posturing during exercises. Ambulated in clinic with SC with a very short step to gait pattern d/t fear of falling. Able to increase pace as time went on. Finished with table exercises at the end of the treatment. SHORT TERM GOALS ( 8 visits)  Knee pain = 0  Knee ROM = WNL  Knee strength = 4+/5  Knee function: bend, walk, up/dn steps, squat w/o pain     LONG TERM GOALS ( 12 visits)  Independent Home Exercise program  Return to normal activity     PATIENT GOAL  Get back to normal      Pt. Education:  [x] Yes  [] No  [x] Reviewed Prior HEP/Ed  Method of Education: [x] Verbal  [x] Demo  [] Written  Comprehension of Education:  [x] Verbalizes understanding. [x] Demonstrates understanding. [] Needs review. [] Demonstrates/verbalizes HEP/Ed previously given. Plan: [x] Continue with current plan of care towards goals.         Time In: 1:00 pm          Time Out:  2:00 pm    Electronically signed by:  Varun Abernathy PTA

## 2020-12-21 ENCOUNTER — HOSPITAL ENCOUNTER (OUTPATIENT)
Dept: PHYSICAL THERAPY | Facility: CLINIC | Age: 75
Setting detail: THERAPIES SERIES
Discharge: HOME | End: 2020-12-21
Payer: MEDICARE

## 2020-12-21 PROCEDURE — 97110 THERAPEUTIC EXERCISES: CPT

## 2020-12-21 NOTE — FLOWSHEET NOTE
[x] [address]    Outpatient Rehabilitation &  Therapy  [address] [place] [address]  P: [phone]  F: [phone]     Physical Therapy Daily Treatment Note    Date:  2020  Patient Name:  Nicci Downey    :  1945  MRN: [id number]  Physician: [address] [facility] [address]: [address] [place] [address]. MEDICARE              Eligibility Status: Ferdinand Menchaca  DOS: 2020  # of visits allowed/remaining: based on med necessity   Source: Website  Spoke To:  [employer]   Reference: [id number]  Medical Diagnosis: S/P R TKA          Rehab Codes: M17.11  Onset Date: 9/3/20              Visit# / total visits:      Cancels/No Shows: 0    Subjective:   Pain:  [] Yes  [x] No Location: R knee  Pain Rating: (0-10 scale) 0/10  Pain altered Tx:  [x] No  [] Yes  Action:  Comments: No pain. States she has been walking around her house with no AD. Arrives with RW and SPC today, gets more nervous walking in public. Objective:  Modalities:   Precautions:  Exercises:  Exercise: R knee Reps/ Time Weight/ Level Comments   Nustep 10 min       Stretch Gastroc 3x30s       Stretch knee ext/flex 3x30s stool           heelslides 3x10 2#    SAQ 3x10 2#    LAQ 3x10 2#    Bridges 2x10     Supine hip abd 3x10 2#    SLR 2x10 2#           TG B squat, calf raise 2x10  L16     Balance board taps 3x10 M/L  L2   SKE 20x5\" Plum     Step Ups - fwd/lat 2x10 4\"    3 way hip B x10 orange    Marches  2x10     tband sidestepping 2L Orange     Other: Gait training with [facility]     Instructions for subsequent visits:      Treatment Charges: Mins Units   []  Modalities     [x]  Ther Exercise 45 3   []  Manual Therapy     []  Ther Activities     []  Aquatics     []  Vasocompression     []  Other     Total Treatment time 45 3       Assessment: [x] Progressing toward goals. [] No change. [x] Other:  Cont with ex log above with addition of tband sidestepping, OK[place] 3 way hip, and lateral step ups to progress strengthening and overall stability. Able to safely ambulate around clinic with no assistive device however needs to lightly touch therapist's arm for comfort. Cues needed to trust herself and LE's and not rely so heavily on UE during static ex. SHORT TERM GOALS ( 8 visits)  Knee pain = 0  Knee ROM = WNL  Knee strength = 4+/5  Knee function: bend, walk, up/dn steps, squat w/o pain     LONG TERM GOALS ( 12 visits)  Independent Home Exercise program  Return to normal activity     PATIENT GOAL  Get back to normal      Pt. Education:  [x] Yes  [] No  [x] Reviewed Prior HEP/Ed  Method of Education: [x] Verbal  [x] Demo  [] Written  Comprehension of Education:  [x] Verbalizes understanding. [x] Demonstrates understanding. [] Needs review. [] Demonstrates/verbalizes HEP/Ed previously given. Plan: [x] Continue with current plan of care towards goals.         Time In: 2:00 pm          Time Out:  3:00 pm    Electronically signed by:  Radha Coronado PTA

## 2020-12-28 ENCOUNTER — HOSPITAL ENCOUNTER (OUTPATIENT)
Dept: PHYSICAL THERAPY | Facility: CLINIC | Age: 75
Setting detail: THERAPIES SERIES
Discharge: HOME | End: 2020-12-28
Payer: MEDICARE

## 2020-12-28 PROCEDURE — 97110 THERAPEUTIC EXERCISES: CPT

## 2020-12-28 NOTE — FLOWSHEET NOTE
[x] [address]    Outpatient Rehabilitation &  Therapy  [address] [facility]  P: [phone]  F: [phone]     Physical Therapy Daily Treatment Note    Date:  2020  Patient Name:  Joshua Alarcon    :  1945  MRN: [id number]  Physician: [address]: Claudio Padron MEDICARE              Eligibility Status: Cindi Espana  DOS: 2020  # of visits allowed/remaining: based on med necessity   Source: Website  Spoke To:  [employer]   Reference: [id number]  Medical Diagnosis: S/P R TKA          Rehab Codes: M17.11  Onset Date: 9/3/20              Visit# / total visits:      Cancels/No Shows: 0    Subjective:   Pain:  [] Yes  [x] No Location: R knee  Pain Rating: (0-10 scale) 0/10  Pain altered Tx:  [x] No  [] Yes  Action:  Comments: States the lump on the medial aspect of her R knee is painful today, is going to call her doctor after therapy.      Objective:  Modalities:   Precautions:  Exercises:  Exercise: R knee Reps/ Time Weight/ Level Comments    Nustep 10 min     x   Stretch Gastroc 3x30s     x   Stretch knee ext/flex 3x30s stool   x          heelslides 3x10 2#     SAQ 3x10 2.5#  x   LAQ 3x10 2.5#  x   Bridges 2x10   x   Supine hip abd 3x10 2#     SLR 2x10 2.5#   x          TG B squat, calf raise 2x10  L16      Balance board taps 3x10 M/L  L2    SKE 20x5\" Plum   x   Step Ups - fwd/lat 2x10 4\"  x   3 way hip B x10 orange  x   Marches  2x10   x   tband sidestepping 2L Orange   x   Heel Taps 2x10 2\"  x   Other: Gait training with [facility]     Instructions for subsequent visits:      Treatment Charges: Mins Units   []  Modalities     [x]  Ther Exercise 40 3   []  Manual Therapy     []  Ther Activities     []  Aquatics     []  Vasocompression     []  Other     Total Treatment time 40 3 Assessment: [x] Progressing toward goals. Added heel taps to work on quad strengthening and overall mental trust in RLE. Continues to be weary with initiating ambulation but gets more confidence once started. Fatigued post tx.     [] No change. [] Other:    SHORT TERM GOALS ( 8 visits)  Knee pain = 0  Knee ROM = WNL  Knee strength = 4+/5  Knee function: bend, walk, up/dn steps, squat w/o pain     LONG TERM GOALS ( 12 visits)  Independent Home Exercise program  Return to normal activity     PATIENT GOAL  Get back to normal      Pt. Education:  [x] Yes  [] No  [x] Reviewed Prior HEP/Ed  Method of Education: [x] Verbal  [x] Demo  [] Written  Comprehension of Education:  [x] Verbalizes understanding. [x] Demonstrates understanding. [] Needs review. [] Demonstrates/verbalizes HEP/Ed previously given. Plan: [x] Continue with current plan of care towards goals.         Time In: 1:00 pm          Time Out:  1:52 pm    Electronically signed by:  Bina Garibay PTA

## 2020-12-30 ENCOUNTER — HOSPITAL ENCOUNTER (OUTPATIENT)
Dept: MAMMOGRAPHY | Age: 75
Discharge: HOME | End: 2021-01-01
Payer: MEDICARE

## 2020-12-30 DIAGNOSIS — Z12.31 VISIT FOR SCREENING MAMMOGRAM: ICD-10-CM

## 2020-12-30 PROCEDURE — 77063 BREAST TOMOSYNTHESIS BI: CPT

## 2021-01-04 ENCOUNTER — HOSPITAL ENCOUNTER (OUTPATIENT)
Dept: PHYSICAL THERAPY | Facility: CLINIC | Age: 76
Setting detail: THERAPIES SERIES
Discharge: HOME | End: 2021-01-04
Payer: MEDICARE

## 2021-01-04 PROCEDURE — 97110 THERAPEUTIC EXERCISES: CPT

## 2021-01-04 NOTE — FLOWSHEET NOTE
[x] [address]    Outpatient Rehabilitation &  Therapy  [address] [place] [address]  P: [phone]  F: [phone]     Physical Therapy Daily Treatment Note    Date:  2021  Patient Name:  Javier Marte    :  1945  MRN: [id number]  Physician: Josh Banner Street: Lewis Rosario MEDICARE              Eligibility Status: Lai Linares  DOS: 2020  # of visits allowed/remaining: based on med necessity   Source: Website  Spoke To:  [employer]   Reference: [id number]  Medical Diagnosis: S/P R TKA          Rehab Codes: M17.11  Onset Date: 9/3/20              Visit# / total visits:      Cancels/No Shows: 0    Subjective: Pt reports R ant/inf knee soreness after having a fall yesterday, states she was walking w/o cane when she stumbled forward landing on her L arm and knee.   Pain:  [] Yes  [x] No Location: R knee  Pain Rating: (0-10 scale) 0/10  Pain altered Tx:  [x] No  [] Yes  Action:  Comments:     Objective:  + tenderness R ant knee  Cont previous edema, med soft tissue density  AROM R knee = 0 -100   Amb w/ str cane, lack of knee flex, short stride, apprehensive of balance    Precautions:  Exercises:  Exercise: R knee Reps/ Time Weight/ Level Comments    Nustep 10 min     x   Stretch Gastroc 3x30s     x   Stretch knee ext/flex 3x30s stool   x          heelslides 3x10 2#     SAQ 3x10 2.5#  x   LAQ 3x10 2.5#  x   Bridges 3x10   x   Supine hip abd 3x10 2#     SLR 3x10 2.5#   x          TG B squat, calf raise 2x10  L18      Balance board taps 3x10 M/L  L2    SKE 20x5\" Plum   x   Step Ups - fwd/lat 2x10 4\"  x   3 way hip B x10 orange  x   Marches  2x10   x   tband sidestepping 2L Orange   x   Heel Taps 2x10 2\"  x   Other: Gait training with [facility]     Instructions for subsequent visits:      Treatment Charges: Mins Units   []  Modalities     [x]  Ther Exercise 40 3   []  Manual Therapy     []  Ther Activities     []  Aquatics     []  Vasocompression     []  Other Total Treatment time 40 3       Assessment: [x] Progressing toward goals. [] No change. [x] Other:  Pt able to complete exercises as previous w/o sig knee symptom aggravation. Pt advised to use walker at home until strength improves  SHORT TERM GOALS ( 8 visits)  Knee pain = 0  Knee ROM = WNL  Knee strength = 4+/5  Knee function: bend, walk, up/dn steps, squat w/o pain     LONG TERM GOALS ( 12 visits)  Independent Home Exercise program  Return to normal activity     PATIENT GOAL  Get back to normal      Pt. Education:  [x] Yes  [] No  [x] Reviewed Prior HEP/Ed  Method of Education: [x] Verbal  [x] Demo  [] Written  Comprehension of Education:  [x] Verbalizes understanding. [x] Demonstrates understanding. [] Needs review. [] Demonstrates/verbalizes HEP/Ed previously given. Plan: [x] Continue with current plan of care towards goals.         Time In: 1:00 pm          Time Out:  1:52 pm    Electronically signed by:  Regine Artis, PT

## 2021-01-08 ENCOUNTER — HOSPITAL ENCOUNTER (OUTPATIENT)
Dept: PHYSICAL THERAPY | Facility: CLINIC | Age: 76
Setting detail: THERAPIES SERIES
Discharge: HOME | End: 2021-01-08
Payer: MEDICARE

## 2021-01-08 NOTE — FLOWSHEET NOTE
[] [address]  [address]    [phone]  F: [phone]   [] [address]  [facility] 36   [address]  P: [phone]  F: [phone]  [x] [address] &  Therapy  [address] [facility]  P: [phone]  F: [phone] [] [address] [employer] [address] [address]  P: [phone]  F: [phone]  [] [address]  [address] [address] [facility] [address]   [address]   [place]: [phone]  F: [phone]   [] [facility]  [address] [facility] [address]  [place]: [phone]   F: [phone]     Physical Therapy Cancel/No Show note    Date: 2021  Patient: Anna Marie Kamara  : 1945  MRN: [id number]    Cancels/No Shows to date: 0    For today's appointment patient:    [x]  Cancelled    [] Rescheduled appointment    [] No-show     Reason given by patient:    []  Patient ill    []  Conflicting appointment    [] No transportation      [] Conflict with work    [] No reason given    [] Weather related    [] COVID-19    [x] Other:      Comments:  Pt had her appointment times confused.       [x] Next appointment was confirmed    Electronically signed by: Frantz Deluca PTA

## 2021-01-11 ENCOUNTER — HOSPITAL ENCOUNTER (OUTPATIENT)
Dept: PHYSICAL THERAPY | Facility: CLINIC | Age: 76
Setting detail: THERAPIES SERIES
Discharge: HOME | End: 2021-01-11
Payer: MEDICARE

## 2021-01-11 PROCEDURE — 97110 THERAPEUTIC EXERCISES: CPT

## 2021-01-11 NOTE — FLOWSHEET NOTE
[x] [address]    Outpatient Rehabilitation &  Therapy  [address] [facility]  P: [phone]  F: [phone]     Physical Therapy Daily Treatment Note    Date:  2021  Patient Name:  Brenda Mckeon    :  1945  MRN: [id number]  Physician: Josh Martínez Street: Delcie Haws MEDICARE              Eligibility Status: Tiera Wilburn  DOS: 2020  # of visits allowed/remaining: based on med necessity   Source: Website  Spoke To:  [employer]   Reference: [id number]  Medical Diagnosis: S/P R TKA          Rehab Codes: M17.11  Onset Date: 9/3/20              Visit# / total visits: 10/12     Cancels/No Shows: 0    Subjective: Pain:  [] Yes  [x] No Location: R knee  Pain Rating: (0-10 scale) 0/10  Pain altered Tx:  [x] No  [] Yes  Action:  Comments: Pt reports no pain in the R knee today. Having more soreness on the L side d/t landing on that side during the fall last week. Does feel like her R knee is moving around easier, however states the lump is starting to become painful and bother her more.      Objective:  + tenderness R ant knee  Cont previous edema, med soft tissue density  AROM R knee = 0 -100   Amb w/ str cane, lack of knee flex, short stride, apprehensive of balance    Precautions:  Exercises:  Exercise: R knee Reps/ Time Weight/ Level Comments    Nustep 10 min     x   Stretch Gastroc 3x30s     x   Stretch knee ext/flex 3x30s stool   x          heelslides 3x10 2#     SAQ 3x10 2.5#     LAQ 3x10 2.5#     Bridges 3x10   x   Supine hip abd 3x10 2#     SLR 3x10 2.5#   x          TG B squat, calf raise 2x10  L18      Balance board taps 3x10 M/L  L2    SKE 2' 5\" holds Plum   x   Step Ups - fwd/lat 2x10 4\"  x   3 way hip B x10 lime  x   Marches  2x10   x   tband sidestepping 2L Orange   x   Heel Taps 2x10 2\"     Heel Raises x20   x   Mini Squats 2x10   x          Other: Gait training with [facility]     Instructions for subsequent visits:      Treatment Charges: Mins Units []  Modalities     [x]  Ther Exercise 40 3   []  Manual Therapy     []  Ther Activities     []  Aquatics     []  Vasocompression     []  Other     Total Treatment time 40 3       Assessment: [x] Progressing toward goals. [] No change. [x] Other:  Continues to be apprehensive with ambulation around clinic with SPC, reaching for objects and therapist's arm. Added full body heel raises and mini squats in the parallel bars. Frequent c/o lump on RLE being \"annoying\" and also painful with certain movements. Discussed with pt calling plastic surgeon to try and get appt moved up from February 5th d/t it affecting daily activities. SHORT TERM GOALS ( 8 visits)  Knee pain = 0  Knee ROM = WNL  Knee strength = 4+/5  Knee function: bend, walk, up/dn steps, squat w/o pain     LONG TERM GOALS ( 12 visits)  Independent Home Exercise program  Return to normal activity     PATIENT GOAL  Get back to normal      Pt. Education:  [x] Yes  [] No  [x] Reviewed Prior HEP/Ed  Method of Education: [x] Verbal  [x] Demo  [] Written  Comprehension of Education:  [x] Verbalizes understanding. [x] Demonstrates understanding. [] Needs review. [] Demonstrates/verbalizes HEP/Ed previously given. Plan: [x] Continue with current plan of care towards goals.         Time In: 3:00 pm          Time Out:  3:55 pm    Electronically signed by:  Lina Garcia PTA

## 2021-01-15 ENCOUNTER — HOSPITAL ENCOUNTER (OUTPATIENT)
Dept: PHYSICAL THERAPY | Facility: CLINIC | Age: 76
Setting detail: THERAPIES SERIES
Discharge: HOME | End: 2021-01-15
Payer: MEDICARE

## 2021-01-15 PROCEDURE — 97110 THERAPEUTIC EXERCISES: CPT

## 2021-01-15 NOTE — FLOWSHEET NOTE
[x] [address]    Outpatient Rehabilitation &  Therapy  [address] [facility]  P: [phone]  F: [phone]     Physical Therapy Daily Treatment Note    Date:  1/15/2021  Patient Name:  Jennifer Owusu    :  1945  MRN: [id number]  Physician: Josh T[address]o Street: Dc Almonte MEDICARE              Eligibility Status: Rk Mulligan  DOS: 2020  # of visits allowed/remaining: based on med necessity   Source: Website  Spoke To:  [employer]   Reference: [id number]  Medical Diagnosis: S/P R TKA          Rehab Codes: M17.11  Onset Date: 9/3/20              Visit# / total visits:      Cancels/No Shows: 0    Subjective: Pain:  [] Yes  [x] No Location: R knee  Pain Rating: (0-10 scale) 0/10  Pain altered Tx:  [x] No  [] Yes  Action:  Comments: Pt reports she saw the plastic surgeon who ordered an ultrasound of the lump. She states the doctor told her the lump was cosmetic. Pt states she is trying to find a different plastic surgeon for a second opinion.      Objective:  + tenderness R ant knee  Cont previous edema, med soft tissue density  AROM R knee = 0 -100   Amb w/ str cane, lack of knee flex, short stride, apprehensive of balance    Precautions:  Exercises:  Exercise: R knee Reps/ Time Weight/ Level Comments    Nustep 10 min     x   Stretch Gastroc 3x30s     x   Stretch knee ext/flex 3x30s stool   x          heelslides 3x10 2#     SAQ 3x10 2.5#     LAQ 3x10 2.5#     Bridges 3x10   x   Supine hip abd 3x10 2#     SLR 3x10 2.5#   x          TG B squat, calf raise 2x10  Y32      Balance board  1' L2  L2    SKE 2' 5\" holds Plum   x   Step Ups - fwd/lat 2x10 4\"  x   3 way hip B x10 lime  x   Marches  2x10   x   tband sidestepping 2L Orange   x   Heel Taps 2x10 2\"     Heel Raises x20   x   Mini Squats 2x10   x          Other: Gait training with [facility]     Instructions for subsequent visits:      Treatment Charges: Mins Units   []  Modalities     [x]  Ther Exercise 40 3 []  Manual Therapy     []  Ther Activities     []  Aquatics     []  Vasocompression     []  Other     Total Treatment time 40 3       Assessment: [x] Progressing toward goals. [] No change. [x] Other:  Apprehensive with ambulation. Frequent cues for proper gait mechanics with SPC with poor carry over to pt. Continues to ambulate with lack of knee flexion and short stride length. Pt reporting problems with lump on RLE throughout session. States it rubs against LLE and also gets in the way when ambulating, causing her to have a wider [place] ROSA. SHORT TERM GOALS ( 8 visits)  Knee pain = 0  Knee ROM = WNL  Knee strength = 4+/5  Knee function: bend, walk, up/dn steps, squat w/o pain     LONG TERM GOALS ( 12 visits)  Independent Home Exercise program  Return to normal activity     PATIENT GOAL  Get back to normal      Pt. Education:  [x] Yes  [] No  [x] Reviewed Prior HEP/Ed  Method of Education: [x] Verbal  [x] Demo  [] Written  Comprehension of Education:  [x] Verbalizes understanding. [x] Demonstrates understanding. [] Needs review. [] Demonstrates/verbalizes HEP/Ed previously given. Plan: [x] Continue with current plan of care towards goals.         Time In: 2:55 pm          Time Out:  3:55 pm    Electronically signed by:  Claudia Avila PTA

## 2021-01-18 ENCOUNTER — HOSPITAL ENCOUNTER (OUTPATIENT)
Dept: PHYSICAL THERAPY | Facility: CLINIC | Age: 76
Setting detail: THERAPIES SERIES
Discharge: HOME | End: 2021-01-18
Payer: MEDICARE

## 2021-01-18 PROCEDURE — 97110 THERAPEUTIC EXERCISES: CPT

## 2021-01-18 NOTE — FLOWSHEET NOTE
[x] [address]   Outpatient Rehabilitation &  Therapy  [address] [place] [address]  P: [phone]  F: [phone]     Physical Therapy Daily Treatment Note    Date:  2021  Patient Name:  Teena Lou    :  1945  MRN: [id number]  Physician: Josh Nelson Street: Storm Calico MEDICARE              Eligibility Status: Roman Swanson  DOS: 2020  # of visits allowed/remaining: based on med necessity   Source: Website  Spoke To:  [employer]   Reference: [id number]  Medical Diagnosis: S/P R TKA          Rehab Codes: M17.11  Onset Date: 9/3/20              Visit# / total visits:      Cancels/No Shows: 0    Subjective: Pt reports cont knee soreness, stiffness, swelling of R med region that she feels adversely affects her ability to walk properly, waiting to schedule to [facility] [facility] after seeing Plastic Surgeon last week  Pain:  [] Yes  [x] No Location: R knee  Pain Rating: (0-10 scale) 0/10  Pain altered Tx:  [x] No  [] Yes  Action:  Comments:     Objective:  + tenderness R ant knee  Cont previous edema, med soft tissue density  AROM R knee = 0 -100   Amb w/ str cane, lack of knee flex, short stride, apprehensive of balance    Precautions:  Exercises:  Exercise: R knee Reps/ Time Weight/ Level Comments    Nustep 10 min     x   Stretch Gastroc 3x30s     x   Stretch knee ext/flex 3x30s stool   x          heelslides 3x10 3#     SAQ 3x10 3#     LAQ 3x10 3#     Bridges 3x10   x   Supine hip abd 3x10 3#     SLR 3x10 3#   x          TG B squat, calf raise 2x10  J97      Balance board  1' L2  L2    SKE 2' 5\" holds Plum   x   Step Ups - fwd/lat 2x10 4\"  x   3 way hip B x10 lime  x   Marches  2x10 3#  x   tband sidestepping 2L Orange   x   Heel Taps 2x10 2\"     Heel Raises x20   x   Mini Squats 2x10   x          Other: Gait training with [employer]     Instructions for subsequent visits:      Treatment Charges: Mins Units   []  Modalities     [x]  Ther Exercise 45 3   []  Manual Therapy []  Ther Activities     []  Aquatics     []  Vasocompression     []  Other     Total Treatment time 45 3       Assessment: [x] Progressing toward goals. [] No change. [x] Other:  Pt able to increase weights on resistive exercises, but cont to have most difficulty amb w/ str cane d/t tendency to want to use both arms for support  SHORT TERM GOALS ( 8 visits)  Knee pain = 0  Knee ROM = WNL  Knee strength = 4+/5  Knee function: bend, walk, up/dn steps, squat w/o pain     LONG TERM GOALS ( 12 visits)  Independent Home Exercise program  Return to normal activity     PATIENT GOAL  Get back to normal      Pt. Education:  [x] Yes  [] No  [x] Reviewed Prior HEP/Ed  Method of Education: [x] Verbal  [x] Demo  [] Written  Comprehension of Education:  [x] Verbalizes understanding. [x] Demonstrates understanding. [] Needs review. [] Demonstrates/verbalizes HEP/Ed previously given. Plan: [x] Continue with current plan of care towards goals.         Time In: 2:55 pm          Time Out:  3:55 pm    Electronically signed by:  Jenna Levin, PT

## 2021-01-22 ENCOUNTER — HOSPITAL ENCOUNTER (OUTPATIENT)
Dept: PHYSICAL THERAPY | Facility: CLINIC | Age: 76
Setting detail: THERAPIES SERIES
Discharge: HOME | End: 2021-01-22
Payer: MEDICARE

## 2021-01-22 PROCEDURE — 97110 THERAPEUTIC EXERCISES: CPT

## 2021-01-22 NOTE — FLOWSHEET NOTE
[x] [address]    Outpatient Rehabilitation &  Therapy  [address] [place] [address]  P: [phone]  F: [phone]     Physical Therapy Daily Treatment Note    Date:  2021  Patient Name:  Yamila So    :  1945  MRN: [id number]  Physician: Josh [place] and [place] Street: Doreen Leos MEDICARE              Eligibility Status: Rebecca Xiao  DOS: 2020  # of visits allowed/remaining: based on med necessity   Source: Website  Spoke To:  [employer]   Reference: [id number]  Medical Diagnosis: S/P R TKA          Rehab Codes: M17.11  Onset Date: 9/3/20              Visit# / total visits:      Cancels/No Shows: 0    Subjective: Pain:  [] Yes  [x] No Location: R knee  Pain Rating: (0-10 scale) 0/10  Pain altered Tx:  [x] No  [] Yes  Action:  Comments: Has ultrasound scheduled for Feb 3, and follow up with doc on  for plastic surgeon regarding lump on RLE. Objective:    Precautions:  Exercises:  Exercise: R knee Reps/ Time Weight/ Level Comments    Nustep 10 min     x   Stretch Gastroc 3x30s     x   Stretch knee ext/flex 3x30s stool   x          heelslides 3x10 3#     SAQ 3x10 3#  x   LAQ 3x10 3#  x   Bridges 3x10   x   Supine hip abd 3x10 3#     SLR 3x10 3#   x          TG B squat, calf raise 2x10  H20      Balance board  1' L2  L2    SKE 2' 5\" holds Plum   x   Step Ups - fwd/lat 2x10 4\"  x   3 way hip B x10 lime  x   Marches  2x10 3#  x   tband sidestepping 2L Orange   x   Heel Taps 2x10 2\"     Heel Raises x20   x   Mini Squats 2x10   x          Other: Gait training with [employer]     Instructions for subsequent visits:      Treatment Charges: Mins Units   []  Modalities     [x]  Ther Exercise 40 3   []  Manual Therapy     []  Ther Activities     []  Aquatics     []  Vasocompression     []  Other     Total Treatment time 40 3       Assessment: [x] Progressing toward goals. [] No change. [x] Other:  Some improvement noted in confidence ambulating with [facility] throughout clinic. Max cues needed for proper technique of ex. Fatigued post tx. SHORT TERM GOALS ( 8 visits)  Knee pain = 0  Knee ROM = WNL  Knee strength = 4+/5  Knee function: bend, walk, up/dn steps, squat w/o pain     LONG TERM GOALS ( 12 visits)  Independent Home Exercise program  Return to normal activity     PATIENT GOAL  Get back to normal      Pt. Education:  [x] Yes  [] No  [] Reviewed Prior HEP/Ed  Method of Education: [x] Verbal  [x] Demo  [] Written  Comprehension of Education:  [x] Verbalizes understanding. [x] Demonstrates understanding. [] Needs review. [] Demonstrates/verbalizes HEP/Ed previously given. Plan: [x] Continue with current plan of care towards goals.         Time In: 3:00 pm          Time Out:  3:52 pm    Electronically signed by:  Babs Lefort, PTA

## 2021-01-25 ENCOUNTER — HOSPITAL ENCOUNTER (OUTPATIENT)
Dept: PHYSICAL THERAPY | Facility: CLINIC | Age: 76
Setting detail: THERAPIES SERIES
Discharge: HOME | End: 2021-01-25
Payer: MEDICARE

## 2021-01-25 PROCEDURE — 97110 THERAPEUTIC EXERCISES: CPT

## 2021-01-25 NOTE — FLOWSHEET NOTE
[x] [address]   Outpatient Rehabilitation &  Therapy  [address] [facility]  P: [phone]  F: [phone]     Physical Therapy Daily Treatment Note    Date:  2021  Patient Name:  Nicolas Dodd    :  1945  MRN: [id number]  Physician: [address] [place] [address] Street: Carson Ridgel MEDICARE              Eligibility Status: Ole Meo  DOS: 2020  # of visits allowed/remaining: based on med necessity   Source: Website  Spoke To:  [employer]   Reference: [id number]  Medical Diagnosis: S/P R TKA          Rehab Codes: M17.11  Onset Date: 9/3/20              Visit# / total visits:      Cancels/No Shows: 0    Subjective: Pain:  [] Yes  [x] No Location: R knee  Pain Rating: (0-10 scale) 0/10  Pain altered Tx:  [x] No  [] Yes  Action:  Comments: Pt reports no pain in the R knee itself however still has constant issues with lump on medial knee. Also states that the L knee has been bothering her more since her fall. Objective:    Precautions:  Exercises:  Exercise: R knee Reps/ Time Weight/ Level Comments    Nustep 10 min     x   Stretch Gastroc 3x30s     x   Stretch knee ext/flex 3x30s stool   x          heelslides 3x10 3#     SAQ 3x10 3#  x   LAQ 3x10 3#  x   Bridges 3x10   x   Supine hip abd 3x10 3#     SLR 3x10 3#   x          TG B squat, calf raise 2x10  T12      Balance board  2' L2  L2 x   SKE 2' 5\" holds Plum   x   Step Ups - fwd/lat 2x10 6\"  x   3 way hip B x15 lime  x   Marches  2x10 3#     tband sidestepping 2L Orange   x   Heel Taps 2x10 2\"  x   Heel Raises x20      Mini Squats 2x10             Other: Gait training with [facility]     Instructions for subsequent visits:      Treatment Charges: Mins Units   []  Modalities     [x]  Ther Exercise 40 3   []  Manual Therapy     []  Ther Activities     []  Aquatics     []  Vasocompression     []  Other     Total Treatment time 40 3       Assessment: [x] Progressing toward goals. [] No change. [x] Other:  Continued with ex per log above with good tolerance. Max cues required throughout for proper technique. Able to increase height for step ups. Also increased reps for 3 way hip and tband sidestepping. Attempted to add mini lunges however increased pain in L knee therefore held this date. SHORT TERM GOALS ( 8 visits)  Knee pain = 0  Knee ROM = WNL  Knee strength = 4+/5  Knee function: bend, walk, up/dn steps, squat w/o pain     LONG TERM GOALS ( 12 visits)  Independent Home Exercise program  Return to normal activity     PATIENT GOAL  Get back to normal      Pt. Education:  [x] Yes  [] No  [] Reviewed Prior HEP/Ed  Method of Education: [x] Verbal  [] Demo  [] Written  Comprehension of Education:  [x] Verbalizes understanding. [x] Demonstrates understanding. [] Needs review. [] Demonstrates/verbalizes HEP/Ed previously given. Plan: [x] Continue with current plan of care towards goals.         Time In: 3:10 pm          Time Out:  4:00 pm    Electronically signed by:  Hola Crooks PTA

## 2021-01-29 ENCOUNTER — HOSPITAL ENCOUNTER (OUTPATIENT)
Dept: PHYSICAL THERAPY | Facility: CLINIC | Age: 76
Setting detail: THERAPIES SERIES
End: 2021-01-29
Payer: MEDICARE

## 2021-02-01 ENCOUNTER — APPOINTMENT (OUTPATIENT)
Dept: PHYSICAL THERAPY | Facility: CLINIC | Age: 76
End: 2021-02-01
Payer: MEDICARE

## 2021-02-02 ENCOUNTER — HOSPITAL ENCOUNTER (OUTPATIENT)
Dept: PHYSICAL THERAPY | Facility: CLINIC | Age: 76
Setting detail: THERAPIES SERIES
Discharge: HOME | End: 2021-02-02
Payer: MEDICARE

## 2021-02-02 PROCEDURE — 97110 THERAPEUTIC EXERCISES: CPT

## 2021-02-02 NOTE — FLOWSHEET NOTE
[x] [address]    Outpatient Rehabilitation &  Therapy  [address] [facility]  P: [phone]  F: [phone]     Physical Therapy Daily Treatment Note    Date:  2021  Patient Name:  Rahul Jaramillo    :  1945  MRN: [id number]  Physician: MichelineAmira LrJim Wells Street: Tennis Keel MEDICARE              Eligibility Status: Juan F Cha  DOS: 2020  # of visits allowed/remaining: based on med necessity   Source: Website  Spoke To:  [employer]   Reference: [id number]  Medical Diagnosis: S/P R TKA          Rehab Codes: M17.11  Onset Date: 9/3/20              Visit# / total visits:      Cancels/No Shows: 0    Subjective: Pain:  [] Yes  [x] No Location: R knee  Pain Rating: (0-10 scale) 0/10 R knee, 8/10 L knee  Pain altered Tx:  [x] No  [] Yes  Action:  Comments: Pt reports having increased L knee pain over the last couple days, been using ice, tylenol, and salonpos patches with some relief. The pain initially started after the fall, but has been getting worse over the last couple days.      Objective:    Precautions:  Exercises:  Exercise: R knee Reps/ Time Weight/ Level Comments    Nustep 10 min     x   Stretch Gastroc 3x30s     x   Stretch knee ext/flex 3x30s stool   x          heelslides 3x10 3#     SAQ 3x10 3#  x   LAQ 3x10 3#  x   Bridges 3x10   x   Supine hip abd 3x10 3#     SLR 3x10 3#   x   Supine Clamshells 3x10 orange  x          TG B squat, calf raise 2x10  V86      Balance board  2' L2  L2    SKE 2' 5\" holds Plum   x   Step Ups - fwd/lat 2x10 6\"     3 way hip B x15 lime     Marches - R only x30   x   tband sidestepping 2L [place]      Heel Taps 2x10 2\"     Heel Raises x30   x   Mini Squats 2x10             Other: Gait training with [facility]     Instructions for subsequent visits:      Treatment Charges: Mins Units   []  Modalities     [x]  Ther Exercise 40 3   []  Manual Therapy     []  Ther Activities     []  Aquatics     []  Vasocompression     []  Other     Total Treatment time 40 3       Assessment: [x] Progressing toward goals. [] No change. [x] Other:  D/t increased L knee pain today focused primarily on mat ex with good tolerance. Frequent cues needed throughout for technique. Utilized walker throughout clinic today d/t pt more apprehensive than normal with ambulation d/t L knee pain. Advised pt to contact ortho if sx persist.     SHORT TERM GOALS ( 8 visits)  Knee pain = 0  Knee ROM = WNL  Knee strength = 4+/5  Knee function: bend, walk, up/dn steps, squat w/o pain     LONG TERM GOALS ( 12 visits)  Independent Home Exercise program  Return to normal activity     PATIENT GOAL  Get back to normal      Pt. Education:  [x] Yes  [] No  [] Reviewed Prior HEP/Ed  Method of Education: [x] Verbal  [] Demo  [] Written  Comprehension of Education:  [x] Verbalizes understanding. [x] Demonstrates understanding. [] Needs review. [] Demonstrates/verbalizes HEP/Ed previously given. Plan: [x] Continue with current plan of care towards goals.         Time In: 3:00 pm          Time Out:  3:50 pm    Electronically signed by:  Jeimy Rosales PTA

## 2021-02-05 ENCOUNTER — HOSPITAL ENCOUNTER (OUTPATIENT)
Dept: PHYSICAL THERAPY | Facility: CLINIC | Age: 76
Setting detail: THERAPIES SERIES
Discharge: HOME | End: 2021-02-05
Payer: MEDICARE

## 2021-02-05 NOTE — FLOWSHEET NOTE
[] [address]  [address]    [phone]  F: [phone]   [] [address]  [facility] 36   [address]  P: [phone]  F: [phone]  [] [address]  [address] [facility]  P: [phone]  F: [phone] [] [address]  P: [phone]  F: [phone]  [] [address]  [facility]   [address]   [place]: [phone]  F: [phone]   [] [address]  [place]: [phone]   F: [phone]     Physical Therapy Cancel/No Show note    Date: 2021  Patient: Isidra Caldwell  : 1945  MRN: [id number]    Cancels/No Shows to date:      For today's appointment patient:    [x]  Cancelled    [] Rescheduled appointment    [] No-show     Reason given by patient:    []  Patient ill    []  Conflicting appointment    [] No transportation      [] Conflict with work    [] No reason given    [] Weather related    [] COVID-19    [x] Other:      Comments: Family emergency       [x] Next appointment was confirmed    Electronically signed by: Josh Johnston

## 2021-02-09 ENCOUNTER — HOSPITAL ENCOUNTER (OUTPATIENT)
Dept: PHYSICAL THERAPY | Facility: CLINIC | Age: 76
Setting detail: THERAPIES SERIES
Discharge: HOME | End: 2021-02-09
Payer: MEDICARE

## 2021-02-09 NOTE — FLOWSHEET NOTE
[] [facility]) - [facility] &  Therapy  [address]    [phone]  F: [phone]   [] [address]  [facility] 36   [address]  P: [phone]  F: [phone]  [] [address] &  Therapy  [facility]  P: [phone]  F: [phone] [] [address]  P: [phone]  F: [phone]  [] [address]  [facility]   [address]   [place]: [phone]  F: [phone]   [] [address] [place] [address]  [place]: [phone]   F: [phone]     Physical Therapy Cancel/No Show note    Date: 2021  Patient: Dajuan Frank  : 1945  MRN: [id number]    Cancels/No Shows to date:      For today's appointment patient:    [x]  Cancelled    [] Rescheduled appointment    [] No-show     Reason given by patient:    [x]  Patient ill    []  Conflicting appointment    [] No transportation      [] Conflict with work    [] No reason given    [] Weather related    [] COVID-19    [x] Other:      Comments: Pt reports not feeling well after getting Covid vaccine yesterday.       [x] Next appointment was confirmed - 21 at 3pm    Electronically signed by: Johan Yusuf PTA

## 2021-02-12 ENCOUNTER — HOSPITAL ENCOUNTER (OUTPATIENT)
Dept: PHYSICAL THERAPY | Facility: CLINIC | Age: 76
Setting detail: THERAPIES SERIES
Discharge: HOME | End: 2021-02-12
Payer: MEDICARE

## 2021-02-12 NOTE — FLOWSHEET NOTE
[] [facility]) [facility] &  Therapy  [address]    [phone]  F: [phone]   [] [address]  [facility]   [address]  P: [phone]  F: [phone]  [] [place]  [address] [facility] [address]  P: [phone]  F: [phone] [] [address]  P: [phone]  F: [phone]  [] [address]  [facility]   [address]   [place]: [phone]  F: [phone]   [] [facility]  [address] [facility] [address]  [place]: [phone]   F: [phone]     Physical Therapy Cancel/No Show note    Date: 2021  Patient: Janene Sood  : 1945  MRN: [id number]    Cancels/No Shows to date: 9    For today's appointment patient:    [x]  Cancelled    [] Rescheduled appointment    [] No-show     Reason given by patient:    [x]  Patient ill    []  Conflicting appointment    [] No transportation      [] Conflict with work    [] No reason given    [] Weather related    [] COVID-19    [] Other:      Comments: foot swollen        [] Next appointment was confirmed    Electronically signed by: Rolanda Gonsalves

## 2021-02-15 ENCOUNTER — HOSPITAL ENCOUNTER (OUTPATIENT)
Dept: PHYSICAL THERAPY | Facility: CLINIC | Age: 76
Setting detail: THERAPIES SERIES
Discharge: HOME | End: 2021-02-15
Payer: MEDICARE

## 2021-02-15 NOTE — FLOWSHEET NOTE
[] [facility] ([facility]) - [facility] &  Therapy  [address]    [phone]  F: [phone]   [] [address]  [facility]   [address]  P: [phone]  F: [phone]  [] [address] &  Therapy  [address] [facility] [address]  P: [phone]  F: [phone] [] [address]  P: [phone]  F: [phone]  [] [address]  [address] [facility] 70   [address]   [place]: [phone]  F: [phone]   [] [place] [address] [facility] [address]  [place]: [phone]   F: [phone]     Physical Therapy Cancel/No Show note    Date: 2/15/2021  Patient: Dajuan Frank  : 1945  MRN: [id number]    Cancels/No Shows to date: 9    For today's appointment patient:    [x]  Cancelled    [] Rescheduled appointment    [] No-show     Reason given by patient:    []  Patient ill    []  Conflicting appointment    [] No transportation      [] Conflict with work    [] No reason given    [x] Weather related    [] COVID-19    [] Other:      Comments:        [] Next appointment was confirmed    Electronically signed by: Nena Harris

## 2021-03-18 ENCOUNTER — OFFICE VISIT (OUTPATIENT)
Dept: SURGERY | Age: 76
End: 2021-03-18
Payer: MEDICARE

## 2021-03-18 VITALS
WEIGHT: 180 LBS | HEART RATE: 89 BPM | HEIGHT: 64 IN | DIASTOLIC BLOOD PRESSURE: 63 MMHG | BODY MASS INDEX: 30.73 KG/M2 | OXYGEN SATURATION: 94 % | SYSTOLIC BLOOD PRESSURE: 114 MMHG

## 2021-03-18 DIAGNOSIS — R22.41 MASS OF RIGHT KNEE: Primary | ICD-10-CM

## 2021-03-18 PROCEDURE — 99203 OFFICE O/P NEW LOW 30 MIN: CPT | Performed by: PLASTIC SURGERY

## 2021-03-18 NOTE — LETTER
[facility]  [address] [address] [facility] [address]  Phone: [phone]  Fax: [phone]    Betzaida Ceballos        March 18, 2021       Patient: Joe Weston   MR Number: [id number]   YOB: 1945   Date of Visit: 3/18/2021       Dear Dr. Titi Tan: Thank you for the request for consultation for Raffaele Chua to me for the evaluation of right knee mass. Below are the relevant portions of my assessment and plan of care. Plan: We will obtain an MRI as recommended by the ultrasound to further evaluate the mass on the right medial knee. I discussed with the patient that as far as surgical option there is the option of excision versus liposuction if it is lipomatous mass. We will further explore her options after MRI. If you have questions, please do not hesitate to call me. I look forward to following Herbert Reynoso along with you.     Sincerely,        Joe Loco MD    CC providers:  [address]ke[address]  Via [address]     [address], 1 [facility] [address] [place], [place] [address]  Via In [employer]

## 2021-03-22 ENCOUNTER — TELEPHONE (OUTPATIENT)
Dept: ADMINISTRATIVE | Age: 76
End: 2021-03-22

## 2021-03-22 NOTE — TELEPHONE ENCOUNTER
Patient called in to request her Ultrasound of her legs from [facility] be sent to the office.

## 2021-03-23 NOTE — TELEPHONE ENCOUNTER
I called [facility] radiology. They are faxing the report and pushing the images through to [facility].

## 2021-03-29 ENCOUNTER — HOSPITAL ENCOUNTER (OUTPATIENT)
Dept: MRI IMAGING | Age: 76
Discharge: HOME | End: 2021-03-31
Payer: MEDICARE

## 2021-03-29 DIAGNOSIS — R22.41 MASS OF RIGHT KNEE: ICD-10-CM

## 2021-03-29 LAB
CREAT SERPL-MCNC: 0.6 MG/DL (ref 0.5–0.9)
GFR AFRICAN AMERICAN: >60 ML/MIN
GFR NON-AFRICAN AMERICAN: >60 ML/MIN
GFR SERPL CREATININE-BSD FRML MDRD: NORMAL ML/MIN/{1.73_M2}
GFR SERPL CREATININE-BSD FRML MDRD: NORMAL ML/MIN/{1.73_M2}

## 2021-03-29 PROCEDURE — 82565 ASSAY OF CREATININE: CPT

## 2021-03-29 PROCEDURE — 73723 MRI JOINT LWR EXTR W/O&W/DYE: CPT

## 2021-03-29 PROCEDURE — 6360000004 HC RX CONTRAST MEDICATION: Performed by: PLASTIC SURGERY

## 2021-03-29 PROCEDURE — 36415 COLL VENOUS BLD VENIPUNCTURE: CPT

## 2021-03-29 PROCEDURE — A9579 GAD-BASE MR CONTRAST NOS,1ML: HCPCS | Performed by: PLASTIC SURGERY

## 2021-03-29 RX ADMIN — GADOTERIDOL 17 ML: 279.3 INJECTION, SOLUTION INTRAVENOUS at 15:49

## 2021-03-30 ENCOUNTER — TELEPHONE (OUTPATIENT)
Dept: SURGERY | Age: 76
End: 2021-03-30

## 2021-03-30 NOTE — TELEPHONE ENCOUNTER
I received a fax from [employer] regarding information needed to approve a request for MRI. In the letter, they are asking for results of a previous x-ray. We do not have an x-ray on file for this patient. I called the number listed on the letter, but there was no answer.

## 2021-03-31 ENCOUNTER — TELEPHONE (OUTPATIENT)
Dept: SURGERY | Age: 76
End: 2021-03-31

## 2021-04-28 ENCOUNTER — OFFICE VISIT (OUTPATIENT)
Dept: SURGERY | Age: 76
End: 2021-04-28
Payer: MEDICARE

## 2021-04-28 VITALS
SYSTOLIC BLOOD PRESSURE: 124 MMHG | BODY MASS INDEX: 33.66 KG/M2 | DIASTOLIC BLOOD PRESSURE: 81 MMHG | HEART RATE: 97 BPM | WEIGHT: 190 LBS | OXYGEN SATURATION: 99 % | HEIGHT: 63 IN

## 2021-04-28 DIAGNOSIS — E88.1 LIPODYSTROPHY: Primary | ICD-10-CM

## 2021-04-28 PROCEDURE — 99213 OFFICE O/P EST LOW 20 MIN: CPT | Performed by: PLASTIC SURGERY

## 2021-04-28 NOTE — PROGRESS NOTES
10 mg by mouth three times daily      zolpidem (AMBIEN) 10 MG tablet Take 10 mg by mouth nightly as needed. No current facility-administered medications for this visit. Allergies: Allergies   Allergen Reactions    Nsaids Other (See Comments)     Ulcers     Celecoxib Nausea Only    Ibuprofen     Penicillins Hives    Tolmetin Other (See Comments)     Ulcers      Review of Systems:   Constitutional: Negative for fever, chills, fatigue and unexpected weight change. HENT: Negative for hearing loss, sore throat and facial swelling. Eyes: Negative for pain and discharge. Respiratory: Negative for cough and shortness of breath. Cardiovascular: Patient has a history of hypertension. Gastrointestinal: Patient has a history of ulcer. Skin: Negative for pallor and rash. Neurological: Patient has a history of subdural hematoma  Hematological: Does not bruise/bleed easily. Psychiatric/Behavioral: Negative for behavioral problems. The patient is not nervous/anxious. Past Medical History:   Diagnosis Date    Hypertension     Osteoarthritis     SDH (subdural hematoma) (HCC)     trauma fall from porch    Stomach ulcer      Past Surgical History:   Procedure Laterality Date    BACK SURGERY Bilateral     nerve ablation     BREAST LUMPECTOMY      BUNIONECTOMY  1988    right foot    GALLBLADDER SURGERY  2007    HYSTERECTOMY  1981    OVARY REMOVAL       Social History     Socioeconomic History    Marital status:       Spouse name: Not on file    Number of children: Not on file    Years of education: Not on file    Highest education level: Not on file   Occupational History    Not on file   Social Needs    Financial resource strain: Not on file    Food insecurity     Worry: Not on file     Inability: Not on file    Transportation needs     Medical: Not on file     Non-medical: Not on file   Tobacco Use    Smoking status: Former Smoker    Smokeless tobacco: Never Used Substance and Sexual Activity    Alcohol use: Yes     Comment: occassionaly    Drug use: No    Sexual activity: Not on file   Lifestyle    Physical activity     Days per week: Not on file     Minutes per session: Not on file    Stress: Not on file   Relationships    Social connections     Talks on phone: Not on file     Gets together: Not on file     Attends [employer] service: Not on file     Active member of club or organization: Not on file     Attends meetings of clubs or organizations: Not on file     Relationship status: Not on file    Intimate partner violence     Fear of current or ex partner: Not on file     Emotionally abused: Not on file     Physically abused: Not on file     Forced sexual activity: Not on file   Other Topics Concern    Not on file   Social History Narrative    Not on file     Family History   Problem Relation Age of Onset    Stroke Mother     No Known Problems Father      Physical Exam:   /81 (Site: Left Upper Arm, Position: Sitting, Cuff Size: Large Adult)   Pulse 97   Ht 5' 3\" (1.6 m)   Wt 190 lb (86.2 kg)   SpO2 99%   BMI 33.66 kg/m²    Body mass index is 33.66 kg/m². Physical Exam   Nursing note and vitals reviewed. Constitutional: Oriented to person, place, and time. Appears well-developed and well-nourished. No distress. Head: Normocephalic and atraumatic. Eyes: Conjunctivae and EOM are normal.   Pulmonary/Chest: Effort normal. No respiratory distress. Neurological: Alert and oriented to person, place, and time. Skin: Lipomatous mass noted on the right medial knee extends below the knee crease. Psychiatric: Normal mood and affect.  Behavior is normal    Imaging:     EXAMINATION:   MRI OF THE RIGHT KNEE WITHOUT AND WITH CONTRAST, 3/29/2021 3:49 pm       TECHNIQUE:   Multiplanar multisequence MRI of the right knee was performed without and   with the administration of intravenous contrast.       COMPARISON:   Right knee MRI from 07/10/2020     HISTORY:   ORDERING SYSTEM PROVIDED HISTORY: Mass of right knee   TECHNOLOGIST PROVIDED HISTORY:   Reason for Exam: mass of right knee   Acuity: Unknown   Type of Exam: Initial   Relevant Medical/Surgical History: recent knee replacment 9/3/20 mass   posterior medial,   large soft tissue area no marker placed       47-year-old female with possible mass at the posteromedial right knee.       FINDINGS:   Examination is limited due to right knee arthroplasty hardware and associated   susceptibility artifact.       There is a large area of subcutaneous fat involving the medial aspect of the   right knee which appears to represent prominent subcutaneous fat.  No   encapsulated soft tissue or fatty mass, abnormal post contrast enhancement,   or fluid collection is evident within this region.  This region was present   and similar in appearance on 07/10/2020.       No sizable joint effusion.       Mild to moderate nonspecific edema in the subcutaneous fat about the right   knee.       Bone marrow signal intensity within the remaining visualized osseous   structures otherwise grossly unremarkable.       Susceptibility artifact along the anterior aspect of the knee consistent with   prior surgery.           Impression   1. Prominent large area of subcutaneous fat involving the medial aspect of   the right knee which appears to represent prominent subcutaneous fat.  No   encapsulated soft tissue or fatty mass, abnormal post contrast enhancement or   fluid collection is evident within this region.  This is relatively unchanged   in appearance from 07/10/2020.   2. Exam is limited due to right knee arthroplasty hardware and associated   susceptibility artifact. 3. Mild to moderate nonspecific edema in the subcutaneous fat about the knee.                   Impression/Plan:      Diagnosis Orders   1.  Lipodystrophy       Patient Active Problem List   Diagnosis    Palpitation    Essential hypertension    PAT (paroxysmal atrial tachycardia) (HCC)    PVC (premature ventricular contraction)    PAC (premature atrial contraction)       Plan: Mass in the right lower extremity, by the knee. MRI indicated fat in the subcu cutaneous tissue. No encapsulation was noted. I discussed several options with the patient. I recommended liposuction since the patient also has a scar and the patient may develop tissue necrosis in the area between the 2 scars. I discussed the risk of liposuction in detail. All questions were answered. GENERAL INFORMATION  Liposuction is a surgical technique to remove unwanted deposits of fat from specific areas of the body, including the face and neck, upper arms, trunk, abdomen, buttocks, hips and thighs, and the knees, calves and ankles. This is not a substitute for weight reduction, but a method for removing localized deposits of fatty tissue that do not respond to diet or exercise. Liposuction may be performed as a primary procedure for body contouring or combined with other surgical techniques such as facelift, abdominoplasty, or thigh lift procedures to tighten loose skin and supporting structures. The best candidates for liposuction are individuals of relatively normal weight who have excess fat in particular body areas. Having firm, elastic skin will result in a better final contour after liposuction. Skin that has diminished tone due to stretch marks, weight loss, or natural aging will not reshape itself to the new contours and may require additional surgical techniques to remove and tighten excess skin. Body-contour irregularities due to structures other than fat cannot be improved by this technique. Liposuction by itself will not improve areas of dimpled skin known as cellulite.       Suction-assisted lipectomy surgery is performed by using a hollow metal surgical instrument known as a cannula that is inserted through small skin incision(s) and is passed back and forth through the area of fatty deposit. The cannula is attached to a vacuum source, which provides the suction needed to remove the fatty tissue. There are a variety of different techniques used by plastic surgeons for liposuction and care following surgery. Liposuction may be performed under local or general anesthesia. Tumescent liposuction technique involves the infiltration of fluid containing dilute local anesthetic and epinephrine into areas of fatty deposits. This technique can reduce discomfort at the time of surgery, blood loss, and post-operative bruising. Support garments and dressings are worn to control swelling and promote healing. Your surgeon may recommend that you make arrangements to donate a unit of your own blood that would be used if a blood transfusion were necessary after surgery. ALTERNATIVE TREATMENTS  Alternative forms of management consist of not treating the areas of fatty deposits. Diet and exercise regimens may be of benefit in the overall reduction of excess body fat. Direct removal of excess skin and fatty tissue may be necessary in addition to liposuction in some patients. Risks and potential complications are associated with alternative surgical forms of treatment. RISKS OF LIPOSUCTION SURGERY  Every surgical procedure involves a certain amount of risk and it is important that you understand these risks and the possible complications associated with them. In addition, every procedure has limitations. An individual's choice to undergo a surgical procedure is based on the comparison of the risk to potential benefit. Although the majority of patients do not experience these complications, you should discuss each of them with your plastic surgeon to make sure you completely understand all possible consequences of liposuction.   Patient Selection- Individuals with poor skin tone, medical problems, obesity, or unrealistic expectations may not be candidates for of the body). There is the possibility of visible marks in the skin from sutures. In some cases, scars may require surgical revision or treatment. Change in Skin Sensation- It is common to experience diminished (or loss) of skin sensation in areas that have had surgery. This usually resolves over a period of time. Diminished (or complete loss of skin sensation) infrequently occurs and may not totally resolve. Skin Discoloration / Swelling- Bruising and swelling normally occurs following liposuction. The skin in or near the surgical site can appear either lighter or darker than surrounding skin. Although uncommon, swelling and skin discoloration may persist for long periods of time and, in rare situations, may be permanent. Skin Contour Irregularities- Contour and shape irregularities and depressions may occur after liposuction. Visible and palpable wrinkling of skin can occur. Residual skin irregularities at the ends of the incisions or dog ears are always a possibility as is skin pleating when there is excessive redundant skin. This may improve with time, or it can be surgically corrected. Asymmetry- Symmetrical body appearance may not result from liposuction surgery. Factors such as skin tone, fatty deposits, skeletal prominence, and muscle tone may contribute to normal asymmetry in body features. Additional surgery may be necessary to attempt to improve asymmetry. Seroma- Fluid accumulations infrequently occur in areas where liposuction has been performed. Additional treatments or surgery to drain accumulations of fluid may be necessary. Surgical Anesthesia- Both local and general anesthesia involve risk. There is the possibility of complications, injury, and even death from all forms of surgical anesthesia or sedation. Pain- You will experience pain after your surgery. Pain of varying intensity and duration may occur and persist after liposuction surgery.   Chronic pain may occur very infrequently from nerves becoming trapped in scar tissue. Skin Sensitivity- Itching, tenderness, or exaggerated responses to hot or cold temperatures may occur after surgery. Usually this resolve during healing, but in rare situations it may be chronic. Damage to Deeper Structures- There is the potential for injury to deeper structures including nerves, blood vessels, muscles, and lungs (pneumothorax) during any surgical procedure. The potential for this to occur varies according to the type of procedure being performed. Injury to deeper structures may be temporary or permanent. Delayed Healing- Wound disruption or delayed wound healing is possible. Some areas may not heal normally and may take a long time to heal.  Some areas of skin may die. This may require frequent dressing changes or further surgery to remove the non-healed tissue. Smokers have a greater risk of skin loss and wound healing complications. Allergic Reactions- In rare cases, local allergies to tape, suture material and glues, blood products, topical preparations or injected agents have been reported. Serious systemic reactions including shock (anaphylaxis) may occur to drugs used during surgery and prescription medications. Allergic reactions may require additional treatment. Fat Necrosis- Fatty tissue found deep in the skin might die. This may produce areas of firmness within the skin. Additional surgery to remove areas of fat necrosis may be necessary. There is the possibility of contour irregularities in the skin that may result from fat necrosis. Pubic Distortion- It is possible, though unusual, for women to develop distortion of their labia and pubic area. Should this occur, additional treatment including surgery may be necessary. Umbilicus- Malposition, scarring, unacceptable appearance or loss of the umbilicus (navel) may occur.     Persistent Swelling (Lymphedema)- Persistent swelling in the legs can occur following liposuction. Surgical Shock- In rare circumstances, liposuction can cause severe trauma, particularly when multiple or extensive areas are suctioned at one time. Although serious complications are infrequent, infections or excessive fluid/blood loss can lead to severe illness and even death. If surgical shock occurs after liposuction, hospitalization and additional treatment would be necessary. Individuals undergoing liposuction procedures where a large volume of fat is removed are at greater risk of complications. Patients contemplating large volume liposuction, greater than 5000 cc's, may be advised to have postoperative monitoring and aftercare that involves overnight hospitalization. Deep Venous Thrombosis, Cardiac and Pulmonary Complications- Surgery, especially longer procedures, may be associated with the formation of, or increase in, blood clots in the venous system. Fat embolism syndrome occurs when fat droplets are trapped in the lungs. This is a very rare and possibly fatal complication of suction assisted Lipectomy. Pulmonary complications may occur secondarily to both blood clots (pulmonary emboli), fat deposits (fat emboli) or partial collapse of the lungs after general anesthesia. Pulmonary and fat emboli can be life-threatening or fatal in some circumstances. Inactivity and other conditions may increase the incidence of blood clots traveling to the lungs causing a major blood clot that may result in death. It is important to discuss with your physician any past history of blood clots, swollen legs or the use of estrogen or birth control pills that may contribute to this condition. Cardiac complications are a risk with any surgery and anesthesia, even in patients without symptoms. If you experience shortness of breath, chest pains, or unusual heart beats, seek medical attention immediately.   Should any of these complications occur, you may require a bleed, or hematoma. Activity that increases your pulse or heart rate may cause additional bruising, swelling, and the need for return to surgery and control bleeding. It is wise to refrain from sexual activity until your physician states it is safe. Body-Piercing Procedures- Individuals who currently wear body-piercing jewelry or are seeking to undergo body-piercing procedures must consider the possibility that an infection could develop anytime following this procedure. Treatment including antibiotics, hospitalization or additional surgery may be necessary. Mental Health Disorders and Elective Surgery- It is important that all patients seeking to undergo elective surgery have realistic expectations that focus on improvement rather than perfection. Complications or less than satisfactory results are sometimes unavoidable, may require additional surgery and often are stressful. Please openly discuss with your surgeon, prior to surgery, any history that you may have of significant emotional depression or mental health disorders. Although many individuals may benefit psychologically from the results of elective surgery, effects on mental health cannot be accurately predicted. Medications- There are many adverse reactions that occur as the result of taking over the counter, herbal, and/or prescription medications. Be sure to check with your physician about any drug interactions that may exist with medications which you are already taking. If you have an adverse reaction, stop the drugs immediately and call your plastic surgeon for further instructions. If the reaction is severe, go immediately to the nearest emergency room. When taking the prescribed pain medications after surgery, realize that they can affect your thought process and coordination. Do not drive, do not operate complex equipment, do not make any important decisions and do not drink any alcohol while taking these medications.   Be clinical decisions that were made along with the financial costs of all future treatments.       Electronically signed by:  Julius Mcnulty MD 4/28/2021

## 2021-04-30 ENCOUNTER — HOSPITAL ENCOUNTER (OUTPATIENT)
Age: 76
Setting detail: SPECIMEN
Discharge: HOME | End: 2021-04-30
Payer: MEDICARE

## 2021-04-30 ENCOUNTER — HOSPITAL ENCOUNTER (OUTPATIENT)
Dept: PREADMISSION TESTING | Age: 76
Discharge: HOME | End: 2021-05-04
Payer: MEDICARE

## 2021-04-30 VITALS
TEMPERATURE: 97.7 F | DIASTOLIC BLOOD PRESSURE: 82 MMHG | RESPIRATION RATE: 18 BRPM | SYSTOLIC BLOOD PRESSURE: 139 MMHG | OXYGEN SATURATION: 99 % | HEART RATE: 101 BPM

## 2021-04-30 DIAGNOSIS — Z01.818 PRE-OP TESTING: Primary | ICD-10-CM

## 2021-04-30 DIAGNOSIS — Z01.818 PRE-OP TESTING: ICD-10-CM

## 2021-04-30 LAB
ANION GAP SERPL CALCULATED.3IONS-SCNC: 8 MMOL/L (ref 9–17)
BUN BLDV-MCNC: 18 MG/DL (ref 8–23)
BUN/CREAT BLD: ABNORMAL (ref 9–20)
CALCIUM SERPL-MCNC: 9.3 MG/DL (ref 8.6–10.4)
CHLORIDE BLD-SCNC: 100 MMOL/L (ref 98–107)
CO2: 31 MMOL/L (ref 20–31)
CREAT SERPL-MCNC: 0.54 MG/DL (ref 0.5–0.9)
GFR AFRICAN AMERICAN: >60 ML/MIN
GFR NON-AFRICAN AMERICAN: >60 ML/MIN
GFR SERPL CREATININE-BSD FRML MDRD: ABNORMAL ML/MIN/{1.73_M2}
GFR SERPL CREATININE-BSD FRML MDRD: ABNORMAL ML/MIN/{1.73_M2}
GLUCOSE BLD-MCNC: 92 MG/DL (ref 70–99)
HCT VFR BLD CALC: 43.6 % (ref 36.3–47.1)
HEMOGLOBIN: 13.4 G/DL (ref 11.9–15.1)
MCH RBC QN AUTO: 28.8 PG (ref 25.2–33.5)
MCHC RBC AUTO-ENTMCNC: 30.7 G/DL (ref 28.4–34.8)
MCV RBC AUTO: 93.6 FL (ref 82.6–102.9)
NRBC AUTOMATED: 0 PER 100 WBC
PDW BLD-RTO: 13.5 % (ref 11.8–14.4)
PLATELET # BLD: 263 K/UL (ref 138–453)
PMV BLD AUTO: 12.8 FL (ref 8.1–13.5)
POTASSIUM SERPL-SCNC: 4.5 MMOL/L (ref 3.7–5.3)
RBC # BLD: 4.66 M/UL (ref 3.95–5.11)
SODIUM BLD-SCNC: 139 MMOL/L (ref 135–144)
WBC # BLD: 11.2 K/UL (ref 3.5–11.3)

## 2021-04-30 PROCEDURE — 93005 ELECTROCARDIOGRAM TRACING: CPT | Performed by: ANESTHESIOLOGY

## 2021-04-30 ASSESSMENT — PAIN DESCRIPTION - ORIENTATION: ORIENTATION: RIGHT

## 2021-05-01 LAB
EKG ATRIAL RATE: 96 BPM
EKG P AXIS: 58 DEGREES
EKG P-R INTERVAL: 168 MS
EKG Q-T INTERVAL: 376 MS
EKG QRS DURATION: 78 MS
EKG QTC CALCULATION (BAZETT): 475 MS
EKG R AXIS: -11 DEGREES
EKG T AXIS: 39 DEGREES
EKG VENTRICULAR RATE: 96 BPM

## 2021-05-02 ENCOUNTER — HOSPITAL ENCOUNTER (OUTPATIENT)
Dept: LAB | Age: 76
Setting detail: SPECIMEN
Discharge: HOME | End: 2021-05-02
Payer: MEDICARE

## 2021-05-02 DIAGNOSIS — Z01.818 PREOP TESTING: Primary | ICD-10-CM

## 2021-05-02 LAB
SARS-COV-2: NORMAL
SARS-COV-2: NOT DETECTED
SOURCE: NORMAL

## 2021-05-02 PROCEDURE — U0005 INFEC AGEN DETEC AMPLI PROBE: HCPCS

## 2021-05-02 PROCEDURE — U0003 INFECTIOUS AGENT DETECTION BY NUCLEIC ACID (DNA OR RNA); SEVERE ACUTE RESPIRATORY SYNDROME CORONAVIRUS 2 (SARS-COV-2) (CORONAVIRUS DISEASE [COVID-19]), AMPLIFIED PROBE TECHNIQUE, MAKING USE OF HIGH THROUGHPUT TECHNOLOGIES AS DESCRIBED BY CMS-2020-01-R: HCPCS

## 2021-05-05 ENCOUNTER — TELEPHONE (OUTPATIENT)
Dept: SURGERY | Age: 76
End: 2021-05-05

## 2021-05-05 NOTE — TELEPHONE ENCOUNTER
Kaela [phone]  from [facility] called to say patient needs cardiac clearance prior to surgery that is scheduled for tomorrow 5/6/21. Please call her back.

## 2021-05-05 NOTE — TELEPHONE ENCOUNTER
Called patient to let her know that a medical/cardiac clearance is needed before she can have surgery, so tomorrow's surgery will have to be canceled, for now. I will send a clearance request to Dr. Kemar Esparza

## 2021-05-12 ENCOUNTER — TELEPHONE (OUTPATIENT)
Dept: SURGERY | Age: 76
End: 2021-05-12

## 2021-05-16 ENCOUNTER — HOSPITAL ENCOUNTER (OUTPATIENT)
Dept: LAB | Age: 76
Setting detail: SPECIMEN
Discharge: HOME | End: 2021-05-16
Payer: MEDICARE

## 2021-05-16 DIAGNOSIS — Z01.818 PREOP TESTING: Primary | ICD-10-CM

## 2021-05-16 PROCEDURE — U0005 INFEC AGEN DETEC AMPLI PROBE: HCPCS

## 2021-05-16 PROCEDURE — U0003 INFECTIOUS AGENT DETECTION BY NUCLEIC ACID (DNA OR RNA); SEVERE ACUTE RESPIRATORY SYNDROME CORONAVIRUS 2 (SARS-COV-2) (CORONAVIRUS DISEASE [COVID-19]), AMPLIFIED PROBE TECHNIQUE, MAKING USE OF HIGH THROUGHPUT TECHNOLOGIES AS DESCRIBED BY CMS-2020-01-R: HCPCS

## 2021-05-17 LAB
SARS-COV-2: NORMAL
SARS-COV-2: NOT DETECTED
SOURCE: NORMAL

## 2021-05-19 ENCOUNTER — ANESTHESIA EVENT (OUTPATIENT)
Dept: OPERATING ROOM | Age: 76
End: 2021-05-19
Payer: MEDICARE

## 2021-05-20 ENCOUNTER — HOSPITAL ENCOUNTER (OUTPATIENT)
Age: 76
Setting detail: OUTPATIENT SURGERY
Discharge: HOME | End: 2021-05-20
Attending: PLASTIC SURGERY | Admitting: PLASTIC SURGERY
Payer: MEDICARE

## 2021-05-20 ENCOUNTER — ANESTHESIA (OUTPATIENT)
Dept: OPERATING ROOM | Age: 76
End: 2021-05-20
Payer: MEDICARE

## 2021-05-20 VITALS — DIASTOLIC BLOOD PRESSURE: 55 MMHG | OXYGEN SATURATION: 98 % | TEMPERATURE: 94.8 F | SYSTOLIC BLOOD PRESSURE: 98 MMHG

## 2021-05-20 VITALS
WEIGHT: 207.25 LBS | HEIGHT: 63 IN | BODY MASS INDEX: 36.72 KG/M2 | OXYGEN SATURATION: 97 % | TEMPERATURE: 97.8 F | DIASTOLIC BLOOD PRESSURE: 76 MMHG | HEART RATE: 72 BPM | SYSTOLIC BLOOD PRESSURE: 117 MMHG | RESPIRATION RATE: 15 BRPM

## 2021-05-20 DIAGNOSIS — E88.1 LIPODYSTROPHY: ICD-10-CM

## 2021-05-20 DIAGNOSIS — G89.18 ACUTE POSTOPERATIVE PAIN: Primary | ICD-10-CM

## 2021-05-20 DIAGNOSIS — Z09 POSTOP CHECK: Primary | ICD-10-CM

## 2021-05-20 PROCEDURE — 6370000000 HC RX 637 (ALT 250 FOR IP)

## 2021-05-20 PROCEDURE — 15879 SUCTION LIPECTOMY LWR EXTREM: CPT | Performed by: PLASTIC SURGERY

## 2021-05-20 PROCEDURE — 2709999900 HC NON-CHARGEABLE SUPPLY: Performed by: PLASTIC SURGERY

## 2021-05-20 PROCEDURE — 7100000001 HC PACU RECOVERY - ADDTL 15 MIN: Performed by: PLASTIC SURGERY

## 2021-05-20 PROCEDURE — 2500000003 HC RX 250 WO HCPCS: Performed by: PLASTIC SURGERY

## 2021-05-20 PROCEDURE — 7100000011 HC PHASE II RECOVERY - ADDTL 15 MIN: Performed by: PLASTIC SURGERY

## 2021-05-20 PROCEDURE — 6360000002 HC RX W HCPCS

## 2021-05-20 PROCEDURE — 7100000010 HC PHASE II RECOVERY - FIRST 15 MIN: Performed by: PLASTIC SURGERY

## 2021-05-20 PROCEDURE — 7100000000 HC PACU RECOVERY - FIRST 15 MIN: Performed by: PLASTIC SURGERY

## 2021-05-20 PROCEDURE — 2580000003 HC RX 258: Performed by: ANESTHESIOLOGY

## 2021-05-20 PROCEDURE — 3600000002 HC SURGERY LEVEL 2 BASE: Performed by: PLASTIC SURGERY

## 2021-05-20 PROCEDURE — 3600000012 HC SURGERY LEVEL 2 ADDTL 15MIN: Performed by: PLASTIC SURGERY

## 2021-05-20 PROCEDURE — 2500000003 HC RX 250 WO HCPCS: Performed by: NURSE ANESTHETIST, CERTIFIED REGISTERED

## 2021-05-20 PROCEDURE — 3700000001 HC ADD 15 MINUTES (ANESTHESIA): Performed by: PLASTIC SURGERY

## 2021-05-20 PROCEDURE — 6360000002 HC RX W HCPCS: Performed by: NURSE ANESTHETIST, CERTIFIED REGISTERED

## 2021-05-20 PROCEDURE — 3700000000 HC ANESTHESIA ATTENDED CARE: Performed by: PLASTIC SURGERY

## 2021-05-20 RX ORDER — FENTANYL CITRATE 50 UG/ML
INJECTION, SOLUTION INTRAMUSCULAR; INTRAVENOUS PRN
Status: DISCONTINUED | OUTPATIENT
Start: 2021-05-20 | End: 2021-05-20 | Stop reason: SDUPTHER

## 2021-05-20 RX ORDER — ONDANSETRON 2 MG/ML
4 INJECTION INTRAMUSCULAR; INTRAVENOUS
Status: DISCONTINUED | OUTPATIENT
Start: 2021-05-20 | End: 2021-05-20 | Stop reason: HOSPADM

## 2021-05-20 RX ORDER — SODIUM CHLORIDE 9 MG/ML
25 INJECTION, SOLUTION INTRAVENOUS PRN
Status: DISCONTINUED | OUTPATIENT
Start: 2021-05-20 | End: 2021-05-20 | Stop reason: HOSPADM

## 2021-05-20 RX ORDER — ONDANSETRON 2 MG/ML
INJECTION INTRAMUSCULAR; INTRAVENOUS PRN
Status: DISCONTINUED | OUTPATIENT
Start: 2021-05-20 | End: 2021-05-20 | Stop reason: SDUPTHER

## 2021-05-20 RX ORDER — SODIUM CHLORIDE 0.9 % (FLUSH) 0.9 %
10 SYRINGE (ML) INJECTION PRN
Status: DISCONTINUED | OUTPATIENT
Start: 2021-05-20 | End: 2021-05-20 | Stop reason: HOSPADM

## 2021-05-20 RX ORDER — FUROSEMIDE 20 MG/1
20 TABLET ORAL DAILY
COMMUNITY

## 2021-05-20 RX ORDER — SODIUM CHLORIDE 0.9 % (FLUSH) 0.9 %
10 SYRINGE (ML) INJECTION EVERY 12 HOURS SCHEDULED
Status: DISCONTINUED | OUTPATIENT
Start: 2021-05-20 | End: 2021-05-20 | Stop reason: HOSPADM

## 2021-05-20 RX ORDER — DIPHENHYDRAMINE HYDROCHLORIDE 50 MG/ML
12.5 INJECTION INTRAMUSCULAR; INTRAVENOUS
Status: DISCONTINUED | OUTPATIENT
Start: 2021-05-20 | End: 2021-05-20 | Stop reason: HOSPADM

## 2021-05-20 RX ORDER — MORPHINE SULFATE 2 MG/ML
2 INJECTION, SOLUTION INTRAMUSCULAR; INTRAVENOUS EVERY 5 MIN PRN
Status: DISCONTINUED | OUTPATIENT
Start: 2021-05-20 | End: 2021-05-20 | Stop reason: HOSPADM

## 2021-05-20 RX ORDER — CLINDAMYCIN PHOSPHATE 900 MG/50ML
INJECTION INTRAVENOUS
Status: DISCONTINUED
Start: 2021-05-20 | End: 2021-05-20 | Stop reason: HOSPADM

## 2021-05-20 RX ORDER — OXYCODONE HYDROCHLORIDE AND ACETAMINOPHEN 5; 325 MG/1; MG/1
2 TABLET ORAL PRN
Status: COMPLETED | OUTPATIENT
Start: 2021-05-20 | End: 2021-05-20

## 2021-05-20 RX ORDER — DEXAMETHASONE SODIUM PHOSPHATE 10 MG/ML
INJECTION, SOLUTION INTRAMUSCULAR; INTRAVENOUS PRN
Status: DISCONTINUED | OUTPATIENT
Start: 2021-05-20 | End: 2021-05-20 | Stop reason: SDUPTHER

## 2021-05-20 RX ORDER — CEPHALEXIN 500 MG/1
500 CAPSULE ORAL 2 TIMES DAILY
Qty: 20 CAPSULE | Refills: 0 | Status: SHIPPED | OUTPATIENT
Start: 2021-05-20 | End: 2021-05-30

## 2021-05-20 RX ORDER — OXYCODONE HYDROCHLORIDE AND ACETAMINOPHEN 5; 325 MG/1; MG/1
TABLET ORAL
Status: COMPLETED
Start: 2021-05-20 | End: 2021-05-20

## 2021-05-20 RX ORDER — PROPOFOL 10 MG/ML
INJECTION, EMULSION INTRAVENOUS PRN
Status: DISCONTINUED | OUTPATIENT
Start: 2021-05-20 | End: 2021-05-20 | Stop reason: SDUPTHER

## 2021-05-20 RX ORDER — PROMETHAZINE HYDROCHLORIDE 25 MG/ML
12.5 INJECTION, SOLUTION INTRAMUSCULAR; INTRAVENOUS
Status: DISCONTINUED | OUTPATIENT
Start: 2021-05-20 | End: 2021-05-20 | Stop reason: HOSPADM

## 2021-05-20 RX ORDER — OXYCODONE HYDROCHLORIDE AND ACETAMINOPHEN 5; 325 MG/1; MG/1
1 TABLET ORAL PRN
Status: COMPLETED | OUTPATIENT
Start: 2021-05-20 | End: 2021-05-20

## 2021-05-20 RX ORDER — MEPERIDINE HYDROCHLORIDE 50 MG/ML
12.5 INJECTION INTRAMUSCULAR; INTRAVENOUS; SUBCUTANEOUS EVERY 5 MIN PRN
Status: DISCONTINUED | OUTPATIENT
Start: 2021-05-20 | End: 2021-05-20 | Stop reason: HOSPADM

## 2021-05-20 RX ORDER — GLYCOPYRROLATE 1 MG/5 ML
SYRINGE (ML) INTRAVENOUS PRN
Status: DISCONTINUED | OUTPATIENT
Start: 2021-05-20 | End: 2021-05-20 | Stop reason: SDUPTHER

## 2021-05-20 RX ORDER — CLINDAMYCIN PHOSPHATE 150 MG/ML
INJECTION, SOLUTION INTRAVENOUS PRN
Status: DISCONTINUED | OUTPATIENT
Start: 2021-05-20 | End: 2021-05-20 | Stop reason: SDUPTHER

## 2021-05-20 RX ORDER — 0.9 % SODIUM CHLORIDE 0.9 %
500 INTRAVENOUS SOLUTION INTRAVENOUS
Status: DISCONTINUED | OUTPATIENT
Start: 2021-05-20 | End: 2021-05-20 | Stop reason: HOSPADM

## 2021-05-20 RX ORDER — HYDROCODONE BITARTRATE AND ACETAMINOPHEN 5; 325 MG/1; MG/1
1 TABLET ORAL EVERY 6 HOURS PRN
Qty: 20 TABLET | Refills: 0 | Status: SHIPPED | OUTPATIENT
Start: 2021-05-20 | End: 2021-05-25

## 2021-05-20 RX ORDER — SODIUM CHLORIDE, SODIUM LACTATE, POTASSIUM CHLORIDE, CALCIUM CHLORIDE 600; 310; 30; 20 MG/100ML; MG/100ML; MG/100ML; MG/100ML
INJECTION, SOLUTION INTRAVENOUS CONTINUOUS
Status: DISCONTINUED | OUTPATIENT
Start: 2021-05-20 | End: 2021-05-20 | Stop reason: HOSPADM

## 2021-05-20 RX ORDER — SODIUM CHLORIDE 9 MG/ML
INJECTION, SOLUTION INTRAVENOUS CONTINUOUS
Status: DISCONTINUED | OUTPATIENT
Start: 2021-05-20 | End: 2021-05-20 | Stop reason: HOSPADM

## 2021-05-20 RX ORDER — LABETALOL 20 MG/4 ML (5 MG/ML) INTRAVENOUS SYRINGE
5 EVERY 10 MIN PRN
Status: DISCONTINUED | OUTPATIENT
Start: 2021-05-20 | End: 2021-05-20 | Stop reason: HOSPADM

## 2021-05-20 RX ADMIN — PHENYLEPHRINE HYDROCHLORIDE 100 MCG: 10 INJECTION INTRAVENOUS at 12:31

## 2021-05-20 RX ADMIN — FENTANYL CITRATE 100 MCG: 50 INJECTION, SOLUTION INTRAMUSCULAR; INTRAVENOUS at 12:16

## 2021-05-20 RX ADMIN — Medication 0.2 MG: at 12:31

## 2021-05-20 RX ADMIN — PROPOFOL 150 MG: 10 INJECTION, EMULSION INTRAVENOUS at 12:16

## 2021-05-20 RX ADMIN — CLINDAMYCIN PHOSPHATE 900 MG: 150 INJECTION, SOLUTION INTRAMUSCULAR; INTRAVENOUS at 12:26

## 2021-05-20 RX ADMIN — OXYCODONE HYDROCHLORIDE AND ACETAMINOPHEN 1 TABLET: 5; 325 TABLET ORAL at 13:36

## 2021-05-20 RX ADMIN — HYDROMORPHONE HYDROCHLORIDE 0.5 MG: 1 INJECTION, SOLUTION INTRAMUSCULAR; INTRAVENOUS; SUBCUTANEOUS at 13:21

## 2021-05-20 RX ADMIN — ONDANSETRON 4 MG: 2 INJECTION INTRAMUSCULAR; INTRAVENOUS at 12:54

## 2021-05-20 RX ADMIN — PHENYLEPHRINE HYDROCHLORIDE 100 MCG: 10 INJECTION INTRAVENOUS at 12:37

## 2021-05-20 RX ADMIN — Medication 0.5 MG: at 13:21

## 2021-05-20 RX ADMIN — DEXAMETHASONE SODIUM PHOSPHATE 5 MG: 10 INJECTION, SOLUTION INTRAMUSCULAR; INTRAVENOUS at 12:25

## 2021-05-20 RX ADMIN — SODIUM CHLORIDE, POTASSIUM CHLORIDE, SODIUM LACTATE AND CALCIUM CHLORIDE: 600; 310; 30; 20 INJECTION, SOLUTION INTRAVENOUS at 12:51

## 2021-05-20 RX ADMIN — SODIUM CHLORIDE, POTASSIUM CHLORIDE, SODIUM LACTATE AND CALCIUM CHLORIDE: 600; 310; 30; 20 INJECTION, SOLUTION INTRAVENOUS at 09:53

## 2021-05-20 ASSESSMENT — PULMONARY FUNCTION TESTS
PIF_VALUE: 13
PIF_VALUE: 2
PIF_VALUE: 13
PIF_VALUE: 1
PIF_VALUE: 2
PIF_VALUE: 4
PIF_VALUE: 13
PIF_VALUE: 3
PIF_VALUE: 13
PIF_VALUE: 2
PIF_VALUE: 13
PIF_VALUE: 1
PIF_VALUE: 13
PIF_VALUE: 0
PIF_VALUE: 13
PIF_VALUE: 13
PIF_VALUE: 2
PIF_VALUE: 2
PIF_VALUE: 13

## 2021-05-20 ASSESSMENT — PAIN SCALES - GENERAL
PAINLEVEL_OUTOF10: 10
PAINLEVEL_OUTOF10: 10

## 2021-05-20 NOTE — OP NOTE
Operative Note      Patient: Dami Lorenzana  YOB: 1945  MRN: [id number]    Date of Procedure: 5/20/2021    Pre-Op Diagnosis: RIGHT KNEE MILD TO MODERATE EDEMA WITH SUBCUTANEOUS FAT  100 cc of tumescent solution was injected. For 700 cc of Lipo aspirate was obtained. Post-Op Diagnosis: Same       Procedure(s):  RIGHT LOWER LEG LIPOSUCTION    Surgeon(s):  Andrea Eddy MD    Assistant:   Resident: Luke Chopra DO    Anesthesia: General    Estimated Blood Loss (mL): Minimal    Complications: None    Specimen: None      INDICATION FOR PROCEDURE:  The patient is a 66 y.o.   female, who has lipodystrophy on the medial aspect of the right knee. The area was  marked. DESCRIPTION OF PROCEDURE:  The patient was marked in the holding area. .  The patient was brought into the room, was placed on the table in the supine position. SCDs were placed on the left lower extremity and turned on prior to induction of anesthesia via intubation. Then, after the patient was intubated, both arms were secured to the arm board. Then, after all the areas were prepped and draped in the usual customary sterile manner, then a time-out was performed and everybody in the room was in agreement with the time-out. Then using #11 blade stab incisions were made. Then, tumescent solution was injected into right medial leg near the knee. And a total of 300 mL of tumescent solution was placed. Then using # 25 m, and cannulize liposuction was performed in Multi directional suction. A total  of 700 mL was suctioned out. Using 4-0 Prolene on the cannula holes were sutured in place. And ABDs were placed and Kerlix was placed. And an Ace wrap was placed. Patient tolerated procedure well. Patient was transferred to recovery room in stable condition.     Electronically signed by Andrea Eddy MD on 5/20/2021 at 1:28 PM

## 2021-05-20 NOTE — H&P
[facility]  [facility] [id number]       Progress note     No chief complaint on file. HPI:   Laurence Purcell is a 66 y.o. female who presents mass on her medial right knee. Patient states that the mass is interfering with her gait and mobility. Patient has to change her gait and have a wider stance so she is able to walk. It constantly rubs on the contralateral leg and causes her irritation. It has caused her to fall. Patient is here for evaluation and treatment. There is nothing that has improved it. Patient does have pain associated with the mass. Patient had an ultrasound which was nondiagnostic. She is here for evaluation treatment. The ultrasound recommended an MRI. Due to the exophytic nature of this mass it is a trip hazard for the patient. Patient has to use a walker due to her unsteady gait and the interference of the mass with her walking.   Medications:     Current Facility-Administered Medications   Medication Dose Route Frequency Provider Last Rate Last Admin    0.9 % sodium chloride infusion   Intravenous Continuous Lorena Mccarty MD        lactated ringers infusion   Intravenous Continuous Lorena Mccarty  mL/hr at 05/20/21 0953 New Bag at 05/20/21 0953    sodium chloride flush 0.9 % injection 10 mL  10 mL Intravenous 2 times per day Lorena Mccarty MD        sodium chloride flush 0.9 % injection 10 mL  10 mL Intravenous PRN Lorena Mccarty MD        0.9 % sodium chloride infusion  25 mL Intravenous PRN Lorena Mccarty MD        meperidine (DEMEROL) injection 12.5 mg  12.5 mg Intravenous Q5 Min PRN Mike Zayas MD        morphine (PF) injection 2 mg  2 mg Intravenous Q5 Min PRN Mike Zayas MD        HYDROmorphone (DILAUDID) injection 0.5 mg  0.5 mg Intravenous Q5 Min PRN Mike Zayas MD        HYDROmorphone (DILAUDID) injection 0.25 mg  0.25 mg Intravenous Q5 Min PRN MD Lucero Arevalorpmeerane (DILAUDID) injection 0.5 mg  0.5 mg Intravenous Q5 Min PRN Lio Malik MD        oxyCODONE-acetaminophen (PERCOCET) 5-325 MG per tablet 1 tablet  1 tablet Oral PRN Lio Malik MD        Or    oxyCODONE-acetaminophen (PERCOCET) 5-325 MG per tablet 2 tablet  2 tablet Oral PRN Lio Malik MD        ondansetron (ZOFRAN) injection 4 mg  4 mg Intravenous Once PRN Lio Malik MD        promethazine (PHENERGAN) injection 12.5 mg  12.5 mg Intravenous Q15 Min PRN Lio Malik MD        0.9 % sodium chloride bolus  500 mL Intravenous Once PRN Lio Malik MD        diphenhydrAMINE (BENADRYL) injection 12.5 mg  12.5 mg Intravenous Once PRN Lio Malik MD        labetalol (NORMODYNE;TRANDATE) injection syringe 5 mg  5 mg Intravenous Q10 Min PRN Lio Malik MD          Allergies: Allergies   Allergen Reactions    Nsaids Other (See Comments)     Ulcers     Celecoxib Nausea Only    Ibuprofen     Penicillins Hives    Tolmetin Other (See Comments)     Ulcers      Review of Systems:   Constitutional: Negative for fever, chills, fatigue and unexpected weight change. HENT: Negative for hearing loss, sore throat and facial swelling. Eyes: Negative for pain and discharge. Respiratory: Negative for cough and shortness of breath. Cardiovascular: Patient has a history of hypertension. Gastrointestinal: Patient has a history of ulcer. Skin: Negative for pallor and rash. Neurological: Patient has a history of subdural hematoma  Hematological: Does not bruise/bleed easily. Psychiatric/Behavioral: Negative for behavioral problems. The patient is not nervous/anxious.       Past Medical History:   Diagnosis Date    Hypertension     Osteoarthritis     SDH (subdural hematoma) (HCC)     trauma fall from porch    Stomach ulcer      Past Surgical History:   Procedure Laterality Date    BACK SURGERY Bilateral     nerve ablation     BREAST LUMPECTOMY      BUNIONECTOMY  1988    right foot    CHOLECYSTECTOMY      GALLBLADDER SURGERY  2007    HYSTERECTOMY  1981    KNEE ARTHROPLASTY Right 09/2020    OVARY REMOVAL       Social History     Socioeconomic History    Marital status:      Spouse name: Not on file    Number of children: Not on file    Years of education: Not on file    Highest education level: Not on file   Occupational History    Not on file   Tobacco Use    Smoking status: Former Smoker    Smokeless tobacco: Never Used   Vaping Use    Vaping Use: Never used   Substance and Sexual Activity    Alcohol use: Yes     Comment: occassionaly    Drug use: No    Sexual activity: Not Currently   Other Topics Concern    Not on file   Social History Narrative    Not on file     Social Determinants of Health     Financial Resource Strain:     Difficulty of Paying Living Expenses:    Food Insecurity:     Worried About Running Out of Food in the Last Year:     [address] in the Last Year:    Transportation Needs:     Lack of Transportation (Medical):  Lack of Transportation (Non-Medical):    Physical Activity:     Days of Exercise per Week:     Minutes of Exercise per Session:    Stress:     Feeling of Stress :    Social Connections:     Frequency of Communication with Friends and Family:     Frequency of Social Gatherings with Friends and Family:     Attends Mu-ism Services:     Active Member of Clubs or Organizations:     Attends Club or Organization Meetings:     Marital Status:    Intimate Partner Violence:     Fear of Current or Ex-Partner:     Emotionally Abused:     Physically Abused:     Sexually Abused:      Family History   Problem Relation Age of Onset    Stroke Mother     No Known Problems Father      Physical Exam:   /64   Pulse 94   Temp (!) 32 °F (0 °C) (Temporal)   Resp 18   Ht 5' 3\" (1.6 m)   Wt 207 lb 4 oz (94 kg)   SpO2 100%   BMI 36.71 kg/m²    Body mass index is 36.71 kg/m².   Physical Exam   Nursing note and vitals reviewed. Constitutional: Oriented to person, place, and time. Appears well-developed and well-nourished. No distress. Head: Normocephalic and atraumatic. Eyes: Conjunctivae and EOM are normal.   Pulmonary/Chest: Effort normal. No respiratory distress. Neurological: Alert and oriented to person, place, and time. Skin: Lipomatous mass noted on the right medial knee extends below the knee crease. Psychiatric: Normal mood and affect.  Behavior is normal    Imaging:     EXAMINATION:   MRI OF THE RIGHT KNEE WITHOUT AND WITH CONTRAST, 3/29/2021 3:49 pm       TECHNIQUE:   Multiplanar multisequence MRI of the right knee was performed without and   with the administration of intravenous contrast.       COMPARISON:   Right knee MRI from 07/10/2020       HISTORY:   ORDERING SYSTEM PROVIDED HISTORY: Mass of right knee   TECHNOLOGIST PROVIDED HISTORY:   Reason for Exam: mass of right knee   Acuity: Unknown   Type of Exam: Initial   Relevant Medical/Surgical History: recent knee replacment 9/3/20 mass   posterior medial,   large soft tissue area no marker placed       47-year-old female with possible mass at the posteromedial right knee.       FINDINGS:   Examination is limited due to right knee arthroplasty hardware and associated   susceptibility artifact.       There is a large area of subcutaneous fat involving the medial aspect of the   right knee which appears to represent prominent subcutaneous fat.  No   encapsulated soft tissue or fatty mass, abnormal post contrast enhancement,   or fluid collection is evident within this region.  This region was present   and similar in appearance on 07/10/2020.       No sizable joint effusion.       Mild to moderate nonspecific edema in the subcutaneous fat about the right   knee.       Bone marrow signal intensity within the remaining visualized osseous   structures otherwise grossly unremarkable.       Susceptibility artifact along the anterior aspect of the knee consistent with   prior surgery.           Impression   1. Prominent large area of subcutaneous fat involving the medial aspect of   the right knee which appears to represent prominent subcutaneous fat.  No   encapsulated soft tissue or fatty mass, abnormal post contrast enhancement or   fluid collection is evident within this region.  This is relatively unchanged   in appearance from 07/10/2020.   2. Exam is limited due to right knee arthroplasty hardware and associated   susceptibility artifact. 3. Mild to moderate nonspecific edema in the subcutaneous fat about the knee.                   Impression/Plan:      Diagnosis Orders   1. Lipodystrophy       Patient Active Problem List   Diagnosis    Palpitation    Essential hypertension    PAT (paroxysmal atrial tachycardia) (HCC)    PVC (premature ventricular contraction)    PAC (premature atrial contraction)       Plan: Mass in the right lower extremity, by the knee. MRI indicated fat in the subcu cutaneous tissue. No encapsulation was noted. I discussed several options with the patient. I recommended liposuction since the patient also has a scar and the patient may develop tissue necrosis in the area between the 2 scars. I discussed the risk of liposuction in detail. All questions were answered. GENERAL INFORMATION  Liposuction is a surgical technique to remove unwanted deposits of fat from specific areas of the body, including the face and neck, upper arms, trunk, abdomen, buttocks, hips and thighs, and the knees, calves and ankles. This is not a substitute for weight reduction, but a method for removing localized deposits of fatty tissue that do not respond to diet or exercise. Liposuction may be performed as a primary procedure for body contouring or combined with other surgical techniques such as facelift, abdominoplasty, or thigh lift procedures to tighten loose skin and supporting structures.     The best candidates for liposuction are individuals of relatively normal weight who have excess fat in particular body areas. Having firm, elastic skin will result in a better final contour after liposuction. Skin that has diminished tone due to stretch marks, weight loss, or natural aging will not reshape itself to the new contours and may require additional surgical techniques to remove and tighten excess skin. Body-contour irregularities due to structures other than fat cannot be improved by this technique. Liposuction by itself will not improve areas of dimpled skin known as cellulite.       Suction-assisted lipectomy surgery is performed by using a hollow metal surgical instrument known as a cannula that is inserted through small skin incision(s) and is passed back and forth through the area of fatty deposit. The cannula is attached to a vacuum source, which provides the suction needed to remove the fatty tissue. There are a variety of different techniques used by plastic surgeons for liposuction and care following surgery. Liposuction may be performed under local or general anesthesia. Tumescent liposuction technique involves the infiltration of fluid containing dilute local anesthetic and epinephrine into areas of fatty deposits. This technique can reduce discomfort at the time of surgery, blood loss, and post-operative bruising. Support garments and dressings are worn to control swelling and promote healing. Your surgeon may recommend that you make arrangements to donate a unit of your own blood that would be used if a blood transfusion were necessary after surgery. ALTERNATIVE TREATMENTS  Alternative forms of management consist of not treating the areas of fatty deposits. Diet and exercise regimens may be of benefit in the overall reduction of excess body fat. Direct removal of excess skin and fatty tissue may be necessary in addition to liposuction in some patients.   Risks and potential complications are associated with alternative surgical forms of treatment. RISKS OF LIPOSUCTION SURGERY  Every surgical procedure involves a certain amount of risk and it is important that you understand these risks and the possible complications associated with them. In addition, every procedure has limitations. An individual's choice to undergo a surgical procedure is based on the comparison of the risk to potential benefit. Although the majority of patients do not experience these complications, you should discuss each of them with your plastic surgeon to make sure you completely understand all possible consequences of liposuction. Patient Selection- Individuals with poor skin tone, medical problems, obesity, or unrealistic expectations may not be candidates for liposuction. Bleeding- It is possible, though unusual, to experience a bleeding episode during or after surgery. Should post-operative bleeding occur, it may require an emergency treatment to drain the accumulated blood or blood transfusion. Intra-operative blood transfusions may be required. Hematoma can occur at any time following injury and may contribute to infection or other problems. Heparin medications that are used to prevent blood clots in veins can produce bleeding and decreased blood platelets. Do not take any aspirin or anti-inflammatory medications for ten days before or after surgery, as this may increase the risk of bleeding. Non-prescription herbs and dietary supplements can increase the risk of surgical bleeding. If blood transfusions are needed to treat blood loss, there is a risk of blood-related infections such as hepatitis and HIV (AIDS). Heparin medications that are used to prevent blood clots in veins can produce bleeding and decreased blood platelets.   If you are on any blood thinners, he would need to check with your prescribing physician to determine if you are allowed to discontinue down prior to surgery. Infection- Infection is unusual after surgery. Should an infection occur, additional treatment including antibiotics, hospitalization, or additional surgery may be necessary. In extremely rare instances, life-threatening infections, including toxic shock syndrome have been noted after liposuction surgery. Scarring- All surgery leaves scars, some more visible than others. Although good wound healing after a surgical procedure is expected, abnormal scars may occur within the skin and deeper tissues. Scars may be unattractive and of different color than surrounding skin. Scar appearance may also vary within the same scar, exhibit contour variations and \"bunching\" due to the amount of excess skin. Scars may be asymmetrical (appear different between right and left side of the body). There is the possibility of visible marks in the skin from sutures. In some cases, scars may require surgical revision or treatment. Change in Skin Sensation- It is common to experience diminished (or loss) of skin sensation in areas that have had surgery. This usually resolves over a period of time. Diminished (or complete loss of skin sensation) infrequently occurs and may not totally resolve. Skin Discoloration / Swelling- Bruising and swelling normally occurs following liposuction. The skin in or near the surgical site can appear either lighter or darker than surrounding skin. Although uncommon, swelling and skin discoloration may persist for long periods of time and, in rare situations, may be permanent. Skin Contour Irregularities- Contour and shape irregularities and depressions may occur after liposuction. Visible and palpable wrinkling of skin can occur. Residual skin irregularities at the ends of the incisions or dog ears are always a possibility as is skin pleating when there is excessive redundant skin. This may improve with time, or it can be surgically corrected.     Asymmetry- Symmetrical body appearance may not result from liposuction surgery. Factors such as skin tone, fatty deposits, skeletal prominence, and muscle tone may contribute to normal asymmetry in body features. Additional surgery may be necessary to attempt to improve asymmetry. Seroma- Fluid accumulations infrequently occur in areas where liposuction has been performed. Additional treatments or surgery to drain accumulations of fluid may be necessary. Surgical Anesthesia- Both local and general anesthesia involve risk. There is the possibility of complications, injury, and even death from all forms of surgical anesthesia or sedation. Pain- You will experience pain after your surgery. Pain of varying intensity and duration may occur and persist after liposuction surgery. Chronic pain may occur very infrequently from nerves becoming trapped in scar tissue. Skin Sensitivity- Itching, tenderness, or exaggerated responses to hot or cold temperatures may occur after surgery. Usually this resolve during healing, but in rare situations it may be chronic. Damage to Deeper Structures- There is the potential for injury to deeper structures including nerves, blood vessels, muscles, and lungs (pneumothorax) during any surgical procedure. The potential for this to occur varies according to the type of procedure being performed. Injury to deeper structures may be temporary or permanent. Delayed Healing- Wound disruption or delayed wound healing is possible. Some areas may not heal normally and may take a long time to heal.  Some areas of skin may die. This may require frequent dressing changes or further surgery to remove the non-healed tissue. Smokers have a greater risk of skin loss and wound healing complications. Allergic Reactions- In rare cases, local allergies to tape, suture material and glues, blood products, topical preparations or injected agents have been reported.   Serious systemic reactions surgery, effects on mental health cannot be accurately predicted. Medications- There are many adverse reactions that occur as the result of taking over the counter, herbal, and/or prescription medications. Be sure to check with your physician about any drug interactions that may exist with medications which you are already taking. If you have an adverse reaction, stop the drugs immediately and call your plastic surgeon for further instructions. If the reaction is severe, go immediately to the nearest emergency room. When taking the prescribed pain medications after surgery, realize that they can affect your thought process and coordination. Do not drive, do not operate complex equipment, do not make any important decisions and do not drink any alcohol while taking these medications. Be sure to take your prescribed medication only as directed. Smoking, Second-Hand Smoke Exposure, Nicotine Products (Patch, Gum, Nasal Spray)-   Patients who are currently smoking, use tobacco products, or nicotine products (patch, gum, or nasal spray) are at a greater risk for significant surgical complications of skin dying, delayed healing, and additional scarring. Individuals exposed to second-hand smoke are also at potential risk for similar complications attributable to nicotine exposure. Additionally, smoking may have a significant negative effect on anesthesia and recovery from anesthesia, with coughing and possibly increased bleeding. Individuals who are not exposed to tobacco smoke or nicotine-containing products have a significantly lower risk of this type of complication. You must stop smoking 6 weeks before and 6 weeks after your surgery. ADDITIONAL SURGERY NECESSARY  There are many variable conditions in addition to risk and potential surgical complications that may influence the long-term result from liposuction. Secondary surgery may be necessary to obtain optimal results.  Even though risks and complications occur infrequently, the risks cited are particularly associated with a liposuction surgery. Other complications and risks can occur but are even more uncommon. Should complications occur, additional surgery or other treatments may be necessary. The practice of medicine and surgery is not an exact science. Although good results are expected, there is no guarantee or warranty expressed or implied, on the results that may be obtained. PATIENT COMPLIANCE   Follow all physician instructions carefully; this is essential for the success of your outcome. It is important that the surgical incisions are not subjected to excessive force, swelling, abrasion, or motion during the time of healing. Personal and vocational activity needs to be restricted. Protective dressings and drains should not be removed unless instructed by your plastic surgeon. Successful post-operative function depends on both surgery and subsequent care. Physical activity that increases your pulse or heart rate may cause bruising, swelling, fluid accumulation and the need for return to surgery. It is wise to refrain from intimate physical activities after surgery until your physician states it is safe. It is important that you participate in follow-up care, return for aftercare, and promote your recovery after surgery. HEALTH INSURANCE  Most health insurance companies exclude coverage for cosmetic surgical operations such as liposuction surgery or any complications that might occur from surgery. Please carefully review your health insurance subscriber-information pamphlet or contact your insurance company for a detailed explanation of their policies. Most insurance plans exclude coverage for secondary or revisionary surgery. FINANCIAL RESPONSIBILITIES  The cost of surgery involves several charges for the services provided.   The total includes fees charged by your surgeon, the cost of surgical supplies, anesthesia, laboratory tests, and possible outpatient hospital charges, depending on where the surgery is performed. Depending on whether the cost of surgery is covered by an insurance plan, you will be responsible for necessary co-payments, deductibles, and charges not covered. The fees charged for this procedure do not include any potential future costs for additional procedures that you elect to have or require in order to revise, optimize, or complete your outcome. Additional costs may occur should complications develop from the surgery. Secondary surgery or hospital day-surgery charges involved with revision surgery will also be your responsibility. In signing the consent for this surgery/procedure, you acknowledge that you have been informed about its risk and consequences and accept responsibility for the clinical decisions that were made along with the financial costs of all future treatments.       Electronically signed by:  Cesar Jenkins MD 5/20/2021

## 2021-05-20 NOTE — ANESTHESIA POSTPROCEDURE EVALUATION
Department of Anesthesiology  Postprocedure Note    Patient: Sarah Beth Paulino  MRN: [id number]  YOB: 1945  Date of evaluation: 5/20/2021  Time:  1:20 PM     Procedure Summary     Date: 05/20/21 Room / Location: [address] [place] [address] N / [address] [facility]    Anesthesia Start: 1581 Anesthesia Stop: 26    Procedure: RIGHT LOWER LEG LIPOSUCTION (Right ) Diagnosis: (RIGHT KNEE MILD TO MODERATE EDEMA WITH SUBCUTANEOUS FAT)    Surgeons: Aldair Lepe MD Responsible Provider: Beryl Hutchison MD    Anesthesia Type: general ASA Status: 3          Anesthesia Type: general    Monica Phase I: Monica Score: 8    Monica Phase II:      Last vitals: Reviewed and per EMR flowsheets.        Anesthesia Post Evaluation    Patient location during evaluation: PACU  Patient participation: complete - patient participated  Level of consciousness: awake and alert  Airway patency: patent  Nausea & Vomiting: no nausea and no vomiting  Complications: no  Cardiovascular status: hemodynamically stable  Respiratory status: face mask and spontaneous ventilation  Hydration status: euvolemic

## 2021-05-20 NOTE — ANESTHESIA PRE PROCEDURE
Department of Anesthesiology  Preprocedure Note       Name:  Shara Son   Age:  66 y.o.  :  1945                                          MRN:  [id number]         Date:  2021      Surgeon: Tyrel Garcia):  Wanda Loera MD    Procedure: Procedure(s):  RIGHT LOWER LEG LIPOSUCTION    Medications prior to admission:   Prior to Admission medications    Medication Sig Start Date End Date Taking? Authorizing Provider   furosemide (LASIX) 20 MG tablet Take 20 mg by mouth daily   Yes Historical Provider, MD   losartan (COZAAR) 100 MG tablet Take 1 tablet by mouth daily 20  Yes Drew Levin MD   dilTIAZem (CARDIZEM CD) 120 MG extended release capsule Take 1 capsule by mouth daily May take 1 additional capsule daily PRN palpitations. 20  Yes Drew Levin MD   rOPINIRole HCl (REQUIP PO) Take by mouth   Yes Historical Provider, MD   pantoprazole (PROTONIX) 40 MG tablet Take 40 mg by mouth daily   Yes Historical Provider, MD   ALPRAZolam (XANAX PO) Take by mouth   Yes Historical Provider, MD   Potassium 99 MG TABS Take by mouth   Yes Historical Provider, MD   estradiol (ESTRACE) 0.5 MG tablet COMPOUNDED MEDICATION   estriol ,estradiol, progesterone ,DHEA 0.25mg-0.96kf-63ja-62fp e4m cap   take one cap po qd   Yes Historical Provider, MD   Lactobacillus (PROBIOTIC ACIDOPHILUS) TABS Take 1 tablet by mouth daily 17  Yes Drew Levin MD   dicyclomine (BENTYL) 10 MG capsule Take 10 mg by mouth three times daily 17  Yes Historical Provider, MD   zolpidem (AMBIEN) 10 MG tablet Take 10 mg by mouth nightly as needed.   17  Yes Historical Provider, MD       Current medications:    Current Facility-Administered Medications   Medication Dose Route Frequency Provider Last Rate Last Admin    0.9 % sodium chloride infusion   Intravenous Continuous Asad Sage MD        lactated ringers infusion   Intravenous Continuous Asad Sage  mL/hr at 21 0953 New Bag at 21 0953    sodium chloride flush 0.9 % injection 10 mL  10 mL Intravenous 2 times per day Jason Mcnally MD        sodium chloride flush 0.9 % injection 10 mL  10 mL Intravenous PRN Jason Mcnally MD        0.9 % sodium chloride infusion  25 mL Intravenous PRN Jason Mcnally MD           Allergies:     Allergies   Allergen Reactions    Nsaids Other (See Comments)     Ulcers     Celecoxib Nausea Only    Ibuprofen     Penicillins Hives    Tolmetin Other (See Comments)     Ulcers        Problem List:    Patient Active Problem List   Diagnosis Code    Palpitation R00.2    Essential hypertension I10    PAT (paroxysmal atrial tachycardia) (HCC) I47.1    PVC (premature ventricular contraction) I49.3    PAC (premature atrial contraction) I49.1       Past Medical History:        Diagnosis Date    Hypertension     Osteoarthritis     SDH (subdural hematoma) ([facility] [address]     trauma fall from porch    Stomach ulcer        Past Surgical History:        Procedure Laterality Date    BACK SURGERY Bilateral     nerve ablation     BREAST LUMPECTOMY      BUNIONECTOMY  1988    right foot    CHOLECYSTECTOMY      GALLBLADDER SURGERY  2007   [facility]    KNEE ARTHROPLASTY Right 09/2020    OVARY REMOVAL         Social History:    Social History     Tobacco Use    Smoking status: Former Smoker    Smokeless tobacco: Never Used   Substance Use Topics    Alcohol use: Yes     Comment: occassionaly                                Counseling given: Not Answered      Vital Signs (Current):   Vitals:    05/20/21 0918 05/20/21 0935   BP:  139/64   Pulse:  94   Resp:  18   Temp:  (!) 32 °F (0 °C)   TempSrc:  Temporal   SpO2:  100%   Weight: 207 lb 4 oz (94 kg)    Height: 5' 3\" (1.6 m)                                               BP Readings from Last 3 Encounters:   05/20/21 139/64   04/30/21 139/82   04/28/21 124/81       NPO Status: Time of last liquid consumption: 0800 (sips of water with meds)                        Time of last solid consumption: 0000 morbid obesity          Endo/Other:    (+) : arthritis:., .                  ROS comment: -NPO AFTER MIDNIGHT  -ALLERGIES - NSAIDS, PCN Abdominal:           Vascular: negative vascular ROS. Anesthesia Plan      general     ASA 3     (LMA)  Induction: intravenous. MIPS: Postoperative opioids intended and Prophylactic antiemetics administered. Anesthetic plan and risks discussed with patient. Plan discussed with CRNA.     Attending anesthesiologist reviewed and agrees with Pre Eval content              Nabila Christianson MD   5/20/2021

## 2021-05-21 ENCOUNTER — TELEPHONE (OUTPATIENT)
Dept: SURGERY | Age: 76
End: 2021-05-21

## 2021-05-21 NOTE — PROGRESS NOTES
CLINICAL PHARMACY NOTE: MEDS TO BEDS    Total # of Prescriptions Filled: 2   The following medications were delivered to the patient:  · Cephalexin 500mg  · Norco 5-325    Additional Documentation:

## 2021-05-24 NOTE — TELEPHONE ENCOUNTER
I called and spoke to patient. She stated she understood, and has not iced her leg. Otherwise, she isn't feeling too bad.

## 2021-05-26 ENCOUNTER — OFFICE VISIT (OUTPATIENT)
Dept: SURGERY | Age: 76
End: 2021-05-26

## 2021-05-26 VITALS
OXYGEN SATURATION: 95 % | DIASTOLIC BLOOD PRESSURE: 69 MMHG | BODY MASS INDEX: 34.73 KG/M2 | WEIGHT: 196 LBS | HEIGHT: 63 IN | HEART RATE: 102 BPM | SYSTOLIC BLOOD PRESSURE: 121 MMHG

## 2021-05-26 DIAGNOSIS — Z09 POSTOPERATIVE EXAMINATION: Primary | ICD-10-CM

## 2021-05-26 PROCEDURE — 99024 POSTOP FOLLOW-UP VISIT: CPT | Performed by: PLASTIC SURGERY

## 2021-05-26 NOTE — PROGRESS NOTES
[address] [facility] SURGICAL SPECIALISTS  [facility] [address]       OFFICE POST-OP NOTE    Patient Name:  Nanci Olvera    :  1945    MRN:  [id number]  STATUS POST  No chief complaint on file. SUBJECTIVE  Patient seen and examined. Patient status post liposuction of the right knee. Her surgery was performed on 2021      PHYSICAL EXAM  Vital Signs:  /69 (Site: Left Upper Arm, Position: Sitting, Cuff Size: Large Adult)   Pulse 102   Ht 5' 3\" (1.6 m)   Wt 196 lb (88.9 kg)   SpO2 95%   BMI 34.72 kg/m²     Incisions:  Suture line clean dry and intact. Skin:  No evidence of infection. Neurologic:  Alert & oriented x 3. ASSESSMENT   Diagnosis Orders   1. Postoperative examination         PLAN  1. Continue compression    Plan discussed with patient.     Electronically signed by:  Radha Smith M.D.   2021

## 2021-06-07 NOTE — FLOWSHEET NOTE
[address]  [address] [facility].  Phone: [phone]  Fax: [phone]    PHYSICAL THERAPY DISCHARGE SUMMARY    Date: 2021  Patient Name: Laurence Purcell        MRN: [id number]     Acct#:   : 1945  (64 y.o.)    [de-identified]  Insurance: [employer] MEDICARE              Eligibility Status: Carolina Trujillo  DOS: 2020  # of visits allowed/remaining: based on med necessity   Source: Website  Spoke To:  [employer]   Reference: [id number]  Medical Diagnosis: S/P R TKA          Rehab Codes: M17.11  Onset Date: 9/3/20      Date of Initial Eval: 20  Date of Final Treatment: 21  Total number of visits: 14    Discharge Status:  [ ] Patient recovered from condition. Treatment Goals were met. [ ] Patient received maximum benefit. No further therapy indicated at this time. [ ] Patient demonstrated improvement from condition with          of           goals met. [ ] Patient to continue exercises/home instructions independently. [ ] Therapy interrupted due to:  [x] Patient has two or more no-shows/cancellations and has been discontinued per our no show/cancellation policy. [ ] Patient has completed their prescribed number of treatment sessions. [ ] Other:      Pain level at Evaluation was    3    /10 and at Discharge was     0   /10. [ ] Patient returned to work. [ ] Patient demonstrated improved level of function. [ ] Patient has returned to previous functional level. [x] Patients current status unknown due to no-shows  [ ] Other:     Recommendations/Comments: As of last visit pt improving, but pt did not return for further PT.      Treatment Included:  [x] Therapeutic Exercise  [  ] Manual Therapy  [ x ] Hot/Cold Pack  [  Clearnce Michel  [  ] Elec-Stim    [  ] Iontophoresis [  ]Aquatics [  ]  Therapeutic Activity   [  ] Neuro Re-Education  [  ] Gait    [  ] Massage  [  ] Traction    Thank you for the patient referral to Physical Therapy.  Please feel free to contact me with any questions or concerns regarding this patient's care.    ______________________________________  Nathalia Bowling, PT   PT ATC    Date: 6/7/2021

## 2021-06-09 ENCOUNTER — OFFICE VISIT (OUTPATIENT)
Dept: SURGERY | Age: 76
End: 2021-06-09

## 2021-06-09 VITALS
HEART RATE: 100 BPM | OXYGEN SATURATION: 95 % | DIASTOLIC BLOOD PRESSURE: 88 MMHG | SYSTOLIC BLOOD PRESSURE: 133 MMHG | WEIGHT: 172 LBS | BODY MASS INDEX: 30.48 KG/M2 | HEIGHT: 63 IN | RESPIRATION RATE: 12 BRPM

## 2021-06-09 DIAGNOSIS — Z98.890 POSTOPERATIVE STATE: Primary | ICD-10-CM

## 2021-06-09 DIAGNOSIS — Z09 POSTOPERATIVE EXAMINATION: ICD-10-CM

## 2021-06-09 PROCEDURE — 99024 POSTOP FOLLOW-UP VISIT: CPT | Performed by: PLASTIC SURGERY

## 2021-06-09 NOTE — PROGRESS NOTES
[address] [place] [address] SURGICAL SPECIALISTS  [facility] [address]       OFFICE POST-OP NOTE    Patient Name:  Maria Esther Lewis    :  1945    MRN:  [id number]  STATUS POST  No chief complaint on file. SUBJECTIVE  Patient seen and examined. Patient status post liposuction of the right knee. Her surgery was performed on 2021  Patient is here for follow-up. PHYSICAL EXAM  Vital Signs:  /88   Pulse 100   Resp 12   Ht 5' 3\" (1.6 m)   Wt 172 lb (78 kg)   SpO2 95%   BMI 30.47 kg/m²     Incisions:  Suture line clean dry and intact. Skin:  No evidence of infection. Neurologic:  Alert & oriented x 3. ASSESSMENT   Diagnosis Orders   1. Postoperative state         PLAN  1. Continue compression  2. Continue massaging the area. 3.  We will send the referral for PT. Follow-up in 2 to 3 weeks. Plan discussed with patient.     Electronically signed by:  Jacqueline Finney M.D.   2021

## 2021-06-21 ENCOUNTER — HOSPITAL ENCOUNTER (OUTPATIENT)
Dept: PHYSICAL THERAPY | Facility: CLINIC | Age: 76
Setting detail: THERAPIES SERIES
Discharge: HOME | End: 2021-06-21
Payer: MEDICARE

## 2021-06-21 PROCEDURE — 97110 THERAPEUTIC EXERCISES: CPT

## 2021-06-21 PROCEDURE — 97162 PT EVAL MOD COMPLEX 30 MIN: CPT

## 2021-06-21 NOTE — FLOWSHEET NOTE
[x] [facility]. [facility]      [facility]    [address]     Phone: [phone]     Fax:  [phone]     Physical Therapy Evaluation    Date:  2021  Patient: Kayla Berger  : 1945  MRN: [id number]  Physician: [address] Vivas Sw: Kristin Minors MEDICARE              Eligibility Status:  Eligible     Secondary Insurance (if applicable)               Eligibility Status: n/a  DOS: 21  # of visits allowed/remaining: medical necessity  Source: Phone  Spoke Carlos Manuel Kamara  Reference: [id number]  Auth: NPRe  Medical Diagnosis: S/P Liposuction of R knee w/ previous TKA   Rehab Codes: M17.11  Onset Date: 21                                   Subjective:  Pt reports pain, stiffness of R med knee region, cont difficulty walking. Pt S/P liposuction of R knee 21 d/t edema/subcutaneous fat that developed after R TKA w/ femoral hardware placement 9/3/20. Pt had begun post-op PT but found her progress limited by the edema that had developed on the med side of her knee. Pt opted for surgical intervention, now returns to resume knee rehabilitation.      PMHx: [] Unremarkable [] Diabetes [] HTN  [] Pacemaker   [] MI/Heart Problems [] Cancer [] Arthritis [] Asthma                         [x] refer to full medical chart  In [facility]  [] Other:        Tests: [x] X-Ray: [] MRI:  [] Other:    Medications: [x] Refer to full medical record [] None [] Other:  Allergies:      [x] Refer to full medical record [] None [] Other:    Function:  Hand Dominance  [x] Right  [] Left  Working:  [] Normal Duty  [] Light Duty  [] Off D/T Condition  [x] Retired     [] Not Employed  []  Disability  [] Other:           Return to work:   Job/ADL Description: Amb w/ walker WBAT R    Pain:  [x] Yes  [] No Pain Rating: (0-10 scale) 3/10  Pain altered Tx:  [] Yes  [x] No  Action:    Symptoms:  [] Improving [] Worsening [x] Same    Objective:   L/R   ROM  ° A/P STRENGTH    Hip Flex WNL WNL 4+ 4    Ext WNL WNL 4+ 4-    Knee Flex WNL 85 4+ 4    Ext WNL 0 4+ 4      OBSERVATION No Deficit Deficit Not Tested Comments   Palpation [] [x]     Sensation [x] []     Edema [] [x]       FUNCTION Normal Difficult Unable   walking [] [x] []   Up/dn steps [] [x] []   Squatting [] [x] []   bending [] [x] []     ASSESSMENT   Pt limited by R knee pain, swelling, ROM loss, functional weakness S/P liposuction of R knee to remove edema/subcuraneous fat. Will resume knee ROM, strengthening, gait training as indicated. PROBLEMS  Knee pain  Knee ROM loss  Knee weakness  Knee functional deficits    SHORT TERM GOALS ( 8 visits)  Knee pain = 0  Knee ROM = WNL  Knee strength = 4+/5  Knee function: bend, walk, up/dn steps, squat w/o pain    LONG TERM GOALS ( 12 visits)  Independent Home Exercise program  Return to normal activity    PATIENT GOAL  Not use walker    Rehab Potential:  [x] Good  [] Fair  [] Poor   Suggested Professional Referral:  [x] No  [] Yes:  Barriers to Goal Achievement[de-identified]  [x] No  [] Yes:  Domestic Concerns:  [x] No  [] Yes:    Pt. Education:  [x] Plans/Goals, Risks/Benefits discussed  [x] Home exercise program  Method of Education: [x] Verbal  [x] Demo  [x] Written  Comprehension of Education:  [x] Verbalizes understanding. [x] Demonstrates understanding. [] Needs Review. [] Demonstrates/verbalizes understanding of HEP/Ed previously given.     Treatment Plan:  [x] Therapeutic Exercise    [] Modalities:  [] Therapeutic Activity    [] Ultrasound  [] Electrical Stimulation  [x] Gait Training     [] Massage       [] Lumbar/Cervical Traction  [] Neuromuscular Re-education [x] Cold/hotpack [] Instruction in HEP  [] Manual Therapy   [] Aquatic Therapy [] Other:     [] Iontophoresis: 4 mg/mL Dexamethasone Sodium Phosphate 40-80 mAmin  [] Drug allergies reviewed    _______Initials           _______Date     Frequency:  2 x/week for 12 visits    Todays Treatment:     6/21/2021 Visit #1    Exercise Reps/ Time Weight/ Level Comments   Quad, HS prema 10x10s Heel slides 2x10 3#    SAQ 2x10 3#    SL hip abd 2x10 3#    SLR 2x10 3#    LAQ 2x10 3#        Specific Instructions for next treatment:    Treatment Charges: Mins Units   [x] Evaluation ----- 1   []  Modalities     [x]  Ther Exercise 20 1   []  Manual Therapy     []  Ther Activities     []  Aquatics     []  Other       Time in: 1500     Time out: 1600    Electronically signed by: Mine Brannon PT        Physician Signature:________________________________Date:__________________  By signing above, I have reviewed this plan of care and certify a need for medically   necessary rehabilitation services.      *PLEASE SIGN ABOVE AND FAX BACK ALL PAGES*

## 2021-06-23 ENCOUNTER — OFFICE VISIT (OUTPATIENT)
Dept: SURGERY | Age: 76
End: 2021-06-23

## 2021-06-23 VITALS
RESPIRATION RATE: 12 BRPM | BODY MASS INDEX: 30.48 KG/M2 | HEART RATE: 84 BPM | HEIGHT: 63 IN | WEIGHT: 172 LBS | OXYGEN SATURATION: 96 %

## 2021-06-23 DIAGNOSIS — Z09 POSTOPERATIVE EXAMINATION: Primary | ICD-10-CM

## 2021-06-23 PROCEDURE — 99024 POSTOP FOLLOW-UP VISIT: CPT | Performed by: PLASTIC SURGERY

## 2021-06-23 NOTE — PROGRESS NOTES
[address] [place] [address] SURGICAL SPECIALISTS  [facility] [address]       OFFICE POST-OP NOTE    Patient Name:  Pearl Brar    :  1945    MRN:  [id number]  STATUS POST  Chief Complaint   Patient presents with    Knee Pain     right knee liposuction DOS- 21       SUBJECTIVE  Patient seen and examined. Patient status post liposuction of the right knee. Her surgery was performed on 2021  Patient is here for follow-up. It is very happy we with the results. After liposuction      Before liposuction  PHYSICAL EXAM  Vital Signs:  Pulse 84   Resp 12   Ht 5' 3\" (1.6 m)   Wt 172 lb (78 kg)   SpO2 96%   BMI 30.47 kg/m²     Incisions: Patient is well healed. Skin:  No evidence of infection. Neurologic:  Alert & oriented x 3. ASSESSMENT   Diagnosis Orders   1. Postoperative examination         PLAN  Continue physical therapy. Continue compression as needed. She may move to [place] when ready. If the patient is still here in 4 weeks patient is to follow-up in 4 weeks.       Electronically signed by:  Mikala Hall M.D.   2021

## 2021-06-24 ENCOUNTER — HOSPITAL ENCOUNTER (OUTPATIENT)
Dept: PHYSICAL THERAPY | Facility: CLINIC | Age: 76
Setting detail: THERAPIES SERIES
Discharge: HOME | End: 2021-06-24
Payer: MEDICARE

## 2021-06-24 PROCEDURE — 97110 THERAPEUTIC EXERCISES: CPT

## 2021-06-24 NOTE — FLOWSHEET NOTE
[x] [address]    Outpatient Rehabilitation &  Therapy  [address] [facility]  P: [phone]  F: [phone]     Physical Therapy Daily Treatment Note    Date:  2021  Patient Name:  Yaakov Painter    :  1945  MRN: [id number]  Physician: James: Alexus Song MEDICARE              Eligibility Status: Laury Carson  Secondary Insurance (if applicable)               Eligibility Status: n/a  DOS: 21  # of visits allowed/remaining: medical necessity  Source: Phone  Spoke Puneet Chavez  Reference: [id number]  Auth: NPRe  Medical Diagnosis: S/P Liposuction of R knee w/ previous TKA      Rehab Codes: M17.11  Onset Date: 21            Visit# / total visits: 2     Cancels/No Shows: 0    Subjective:    Pain:  [x] Yes  [] No Location: R knee   Pain Rating: (0-10 scale) unrated/10  Pain altered Tx:  [x] No  [] Yes  Action:  Comments: Pt arrived on a walker d/t forgetting her cane. Objective:    Visit #2       Exercise Reps/ Time Weight/ Level Comments    NuStep 10m   x   Calf/HS Stretch 30\"x3   x          Glut. ,Quad, HS prema 10x10s     x   Heel slides 2x10 3#   x   SAQ 2x10 3#   x   SL hip abd 2x10 3#   x   SLR 2x10 3#   x   LAQ 2x10 3#   x          Treatment Charges: Mins Units   []  Modalities     [x]  Ther Exercise 42 3   []  Manual Therapy     []  Ther Activities     []  Aquatics     []  Vasocompression     []  Other     Total Treatment time  42 3       Assessment: [x] Progressing toward goals. [] No change. [x] Other: Added in standing stretches today after Nustep warm up. Pt requiring assistance with rolling onto her side d/t difficulty with log rolling. Able to complete all exercises but does get distracted and needs help with rep count. Min to mod A with bed mobility.       SHORT TERM GOALS ( 8 visits)  Knee pain = 0  Knee ROM = WNL  Knee strength = 4+/5  Knee function: bend, walk, up/dn steps, squat w/o pain     LONG TERM GOALS ( 12 visits)  Independent Home Exercise program  Return to normal activity     PATIENT GOAL  Not use walker    Pt. Education:  [x] Yes  [] No  [x] Reviewed Prior HEP/Ed  Method of Education: [x] Verbal  [x] Demo  [] Written  Comprehension of Education:  [x] Verbalizes understanding. [x] Demonstrates understanding. [] Needs review. [] Demonstrates/verbalizes HEP/Ed previously given. Plan: [x] Continue with current plan of care towards goals.         Time In:3:01pm            Time Out:  4:00pm    Electronically signed by:  Yessi Isidro PTA

## 2021-06-30 ENCOUNTER — HOSPITAL ENCOUNTER (OUTPATIENT)
Dept: PHYSICAL THERAPY | Facility: CLINIC | Age: 76
Setting detail: THERAPIES SERIES
Discharge: HOME | End: 2021-06-30
Payer: MEDICARE

## 2021-06-30 NOTE — FLOWSHEET NOTE
[] [facility]) [facility] &  Therapy  [address]    [phone]  F: [phone]   [] [address]  [facility] [address]   [address]  P: [phone]  F: [phone]  [x] [address] &  Therapy  [address] [facility]  P: [phone]  F: [phone] [] [address]  P: [phone]  F: [phone]  [] [address]  [address] [address] [facility] [address]   [address]   [place]: [phone]  F: [phone]   [] [facility]  [address] [facility] [address]  [place]: [phone]   F: [phone]     Physical Therapy Cancel/No Show note    Date: 2021  Patient: Ricky Canada  : 1945  MRN: [id number]    Cancels/No Shows to date: 1    For today's appointment patient:    []  Cancelled    [] Rescheduled appointment    [x] No-show     Reason given by patient:    []  Patient ill    []  Conflicting appointment    [] No transportation      [] Conflict with work    [] No reason given    [] Weather related    [] [id number]    [x] Other:      Comments:  Called pt to inform of NS; able to add pt to schedule       [x] Next appointment was confirmed     Electronically signed by:  Candace Andrea [place]

## 2021-07-01 ENCOUNTER — HOSPITAL ENCOUNTER (OUTPATIENT)
Dept: PHYSICAL THERAPY | Facility: CLINIC | Age: 76
Setting detail: THERAPIES SERIES
Discharge: HOME | End: 2021-07-01
Payer: MEDICARE

## 2021-07-01 PROCEDURE — 97110 THERAPEUTIC EXERCISES: CPT

## 2021-07-01 NOTE — FLOWSHEET NOTE
[x] [address]    Outpatient Rehabilitation &  Therapy  [address] [facility]  P: [phone]  F: [phone]     Physical Therapy Daily Treatment Note    Date:  2021  Patient Name:  Juris Kocher    :  1945  MRN: [id number]  Physician: James: Antony Barton MEDICARE              Eligibility Status: [facility]  Secondary Insurance (if applicable)               Eligibility Status: n/a  DOS: 21  # of visits allowed/remaining: medical necessity  Source: Phone  Spoke Carlton Villagran  Reference: [id number]  Auth: NPRe  Medical Diagnosis: S/P Liposuction of R knee w/ previous TKA      Rehab Codes: M17.11  Onset Date: 21            Visit# / total visits: 3/12     Cancels/No Shows: 1/0    Subjective:    Pain:  [x] Yes  [] No Location: R knee   Pain Rating: (0-10 scale) unrated/10  Pain altered Tx:  [x] No  [] Yes  Action:  Comments: Pt again arrives using RW to amb into the clinic. Only minor pain this date. Objective:    Visit #3       Exercise Reps/ Time Weight/ Level Comments    NuStep 10m   x   Calf/HS Stretch 30\"x3   x          Glut. ,Quad, HS prema 10x10s     x   Heel slides 2x10 3#   x   SAQ 2x10 3#   x   SL hip abd 2x10 3#   x   SLR 2x10 3#   x   LAQ 2x10 3#   x   Seated HS curls 2x10 3#  x          Treatment Charges: Mins Units   []  Modalities     [x]  Ther Exercise 45 3   []  Manual Therapy     []  Ther Activities     []  Aquatics     []  Vasocompression     []  Other     Total Treatment time  45 3       Assessment: [x] Progressing toward goals. [] No change. [x] Other: Continued with Nustep followed by stretches. Mat ex completed as noted above, mod cueing needed for recall of ex. Continued to demonstrate difficulty with bed mobility. Increased time needed to complete all ex. Minor pull through R knee with seated HS curls. Will continue to progress as rhianna.        SHORT TERM GOALS ( 8 visits)  Knee pain = 0  Knee ROM = WNL  Knee strength = 4+/5  Knee function: bend, walk, up/dn steps, squat w/o pain     LONG TERM GOALS ( 12 visits)  Independent Home Exercise program  Return to normal activity     PATIENT GOAL  Not use walker    Pt. Education:  [x] Yes  [] No  [x] Reviewed Prior HEP/Ed  Method of Education: [x] Verbal  [x] Demo  [] Written  Comprehension of Education:  [x] Verbalizes understanding. [x] Demonstrates understanding. [x] Needs review. [] Demonstrates/verbalizes HEP/Ed previously given. Plan: [x] Continue with current plan of care towards goals.         Time In:4:01pm            Time Out:  4:52pm    Electronically signed by:  Paloma Nance PTA

## 2021-07-07 ENCOUNTER — HOSPITAL ENCOUNTER (OUTPATIENT)
Dept: PHYSICAL THERAPY | Facility: CLINIC | Age: 76
Setting detail: THERAPIES SERIES
Discharge: HOME | End: 2021-07-07
Payer: MEDICARE

## 2021-07-07 PROCEDURE — 97110 THERAPEUTIC EXERCISES: CPT

## 2021-07-07 NOTE — FLOWSHEET NOTE
[x] [address]   Outpatient Rehabilitation &  Therapy  [address] [facility]  P: [phone]  F: [phone]     Physical Therapy Daily Treatment Note    Date:  2021  Patient Name:  Sarbjit Vinson    :  1945  MRN: [id number]  Physician: James: Linda Erwin MEDICARE              Eligibility Status:  Eligible     Secondary Insurance (if applicable)               Eligibility Status: n/a  DOS: 21  # of visits allowed/remaining: medical necessity  Source: Phone  Spoke Rafael Herrera  Reference: [id number]  Auth: NPRe  Medical Diagnosis: S/P Liposuction of R knee w/ previous TKA      Rehab Codes: M17.11  Onset Date: 21            Visit# / total visits:      Cancels/No Shows: 1/0    Subjective:    Pain:  [x] Yes  [] No Location: R knee   Pain Rating: (0-10 scale) 0/10  Pain altered Tx:  [x] No  [] Yes  Action:  Comments: Pt arrives utilizing RW and no complaints of knee pain. Pt mentions getting an ingrown toenail removed and then accidentally setting her walker down on it. Objective:    Visit #4       Exercise Reps/ Time Weight/ Level Comments    NuStep 10m   x   Calf/HS Stretch 30\"x3   x          Mat       Glut. ,Quad, HS prema 10x10s     x   Heel slides 2x10 3#   x   Bridges x10   x   SAQ 2x10 3#   x   SL Clamshells 2x10   x   SL hip abd 2x10 3#   x   SLR 2x10 3#   x   LAQ 2x10 3#   x   Seated HS Curls 2x10 3#  x          Gym       Standing Marches x10  B UE upon wall hand rails x   Heel Raises x10   x   HS Curls x10   x                 Treatment Charges: Mins Units   []  Modalities     [x]  Ther Exercise 45 3   []  Manual Therapy     []  Ther Activities     []  Aquatics     []  Vasocompression     []  Other     Total Treatment time  45 3       Assessment: [x] Progressing toward goals. Initiated session with Nu Step followed by exercise program as noted above.  Pt demonstrated slight improvement with bed mobility with decreased assist required from PTA to achieve EOB sitting and log rolling. Able to complete exercises more efficiently this date; incorporated bridges, clamshells and standing static exercises to increase ROM and strength with good tolerance. Mod cueing required during heel slides to enhance ROM to optimize benefits and functional performance. No onset of pain or discomfort post tx and will cont to progress as tolerated. [] No change. [] Other:       SHORT TERM GOALS ( 8 visits)  Knee pain = 0  Knee ROM = WNL  Knee strength = 4+/5  Knee function: bend, walk, up/dn steps, squat w/o pain     LONG TERM GOALS ( 12 visits)  Independent Home Exercise program  Return to normal activity     PATIENT GOAL  Not use walker    Pt. Education:  [x] Yes  [] No  [x] Reviewed Prior HEP/Ed  Method of Education: [x] Verbal  [x] Demo  [] Written  Comprehension of Education:  [x] Verbalizes understanding. [x] Demonstrates understanding. [x] Needs review. [] Demonstrates/verbalizes HEP/Ed previously given. Plan: [x] Continue with current plan of care towards goals. Time In: 2:03 pm          Time Out: 3:00 pm    Electronically signed by:   Beth Cox [place]

## 2021-07-09 ENCOUNTER — HOSPITAL ENCOUNTER (OUTPATIENT)
Dept: PHYSICAL THERAPY | Facility: CLINIC | Age: 76
Setting detail: THERAPIES SERIES
Discharge: HOME | End: 2021-07-09
Payer: MEDICARE

## 2021-07-09 PROCEDURE — 97110 THERAPEUTIC EXERCISES: CPT

## 2021-07-09 NOTE — FLOWSHEET NOTE
[x] [address]    Outpatient Rehabilitation &  Therapy  [address] [facility]  P: [phone]  F: [phone]     Physical Therapy Daily Treatment Note    Date:  2021  Patient Name:  Anson Boast    :  1945  MRN: [id number]  Physician: James: Anyi Clarke MEDICARE              Eligibility Status:  Eligible     Secondary Insurance (if applicable)               Eligibility Status: n/a  DOS: 21  # of visits allowed/remaining: medical necessity  Source: Phone  Spoke Luisito Curry  Reference: [id number]  Auth: NPRe  Medical Diagnosis: S/P Liposuction of R knee w/ previous TKA      Rehab Codes: M17.11  Onset Date: 21            Visit# / total visits:      Cancels/No Shows: 1/0    Subjective:    Pain:  [x] Yes  [] No Location: R knee   Pain Rating: (0-10 scale) 0/10  Pain altered Tx:  [] No  [x] Yes  Action: Pt requested to hold sidelying ex today d/t increased hip soreness. Comments: Pt states no pain in her knee at arrival.     Objective:    Visit #5       Exercise Reps/ Time Weight/ Level Comments    NuStep 10m   x   Calf/HS Stretch 30\"x3   x          Mat       Glut. ,Quad, HS prema 10x10s        Heel slides 2x10 3#      Bridges x10   x   SAQ 2x10 3#   x   SL Clamshells 2x10      SL hip abd 2x10 3#      SLR 2x10 3#   x   LAQ 2x10 3#   x   Seated HS Curls 2x10 3#            Gym       Standing Marches x20  B UE upon wall hand rails x   Heel Raises 2x10   x   HS Curls 2x10 B   x   3 way hip x10 B   x   TKE 2' plum  x   Step Ups x20  4\"  x   Standing without UE support  3x15\" // bars, SBA  x          Treatment Charges: Mins Units   []  Modalities     [x]  Ther Exercise 40 3   []  Manual Therapy     []  Ther Activities     []  Aquatics     []  Vasocompression     []  Other     Total Treatment time  40 3       Assessment: [x] Progressing toward goals. Progressed pt with more standing ex with fair tolerance.  Pt utilizes B UE support for all ex d/t fear and lack of balance. Added static  the parallel bars without UE support, pt with frequent feeling that she is going to fall. Attempted gait training with SPC today however pt too fearful therefore held. Held sidelying ex per pt request d/t increased lateral hip soreness. Cont to progress per tolerance. [] No change. [] Other:       SHORT TERM GOALS ( 8 visits)  Knee pain = 0  Knee ROM = WNL  Knee strength = 4+/5  Knee function: bend, walk, up/dn steps, squat w/o pain     LONG TERM GOALS ( 12 visits)  Independent Home Exercise program  Return to normal activity     PATIENT GOAL  Not use walker    Pt. Education:  [x] Yes  [] No  [x] Reviewed Prior HEP/Ed  Method of Education: [x] Verbal  [x] Demo  [] Written  Comprehension of Education:  [x] Verbalizes understanding. [x] Demonstrates understanding. [x] Needs review. [] Demonstrates/verbalizes HEP/Ed previously given. Plan: [x] Continue with current plan of care towards goals.         Time In: 4:00 pm          Time Out: 4:54 pm    Electronically signed by:  Sharyle Furl, PTA

## 2021-07-12 ENCOUNTER — HOSPITAL ENCOUNTER (OUTPATIENT)
Dept: PHYSICAL THERAPY | Facility: CLINIC | Age: 76
Setting detail: THERAPIES SERIES
Discharge: HOME | End: 2021-07-12
Payer: MEDICARE

## 2021-07-12 PROCEDURE — 97110 THERAPEUTIC EXERCISES: CPT

## 2021-07-12 NOTE — FLOWSHEET NOTE
[x] [address]    Outpatient Rehabilitation &  Therapy  [address] [facility]  P: [phone]  F: [phone]     Physical Therapy Daily Treatment Note    Date:  2021  Patient Name:  Gigi Rojas    :  1945  MRN: [id number]  Physician: James: Luis Quezada MEDICARE              Eligibility Status:  Eligible     Secondary Insurance (if applicable)               Eligibility Status: n/a  DOS: 21  # of visits allowed/remaining: medical necessity  Source: Phone  Spoke Mika Ramirez  Reference: [id number]  Auth: NPRe  Medical Diagnosis: S/P Liposuction of R knee w/ previous TKA      Rehab Codes: M17.11  Onset Date: 21            Visit# / total visits:      Cancels/No Shows: 1/0    Subjective:    Pain:  [x] Yes  [] No Location: R knee   Pain Rating: (0-10 scale) 0/10  Pain altered Tx:  [] No  [x] Yes  Action: Pt requested to hold sidelying ex today d/t increased hip soreness. Comments: Pt states no pain in her knee, just in her hip. Objective:    Visit #5       Exercise Reps/ Time Weight/ Level Comments    NuStep 10m   x   Calf/HS Stretch 30\"x3   x          Mat       Glut. ,Quad, HS prema 10x10s        Heel slides 2x10 3#      Bridges x10      SAQ 2x10 3#      SL Clamshells 2x10      SL hip abd 2x10 3#      SLR 2x10 3#      LAQ 3x10 3#   x   Seated HS Curls 3x10 3#            Gym       Standing Marches x20  B UE upon wall hand rails x   Heel Raises 2x10   x   HS Curls 2x10 B   x   3 way hip x15 B orange  x   TKE 2' plum  x   Step Ups - fwd/lat x20  4\"  x   Step Downs x20   x   Standing without UE support  3x15\" // bars, SBA  x   Balance Board 1' L2  x   tband sidestepping 2L  orange  x   Other: Gait training with SPC      Treatment Charges: Mins Units   []  Modalities     [x]  Ther Exercise 40 3   []  Manual Therapy     []  Ther Activities     []  Aquatics     []  Vasocompression     []  Other     Total Treatment time  40 3       Assessment: [x] Progressing toward goals. Progressed pt with resistance to 3 way hip and additional standing ex to further work on strength and overall stability. Encouraged decreased UE support on the parallel bars with fair follow through. Attempted gait training with SPC however d/t pt's fear of falling she maintained CGA onto therapist at all times. Frequent cuing throughout treatment for upright posture. [] No change. [] Other:       SHORT TERM GOALS ( 8 visits)  Knee pain = 0  Knee ROM = WNL  Knee strength = 4+/5  Knee function: bend, walk, up/dn steps, squat w/o pain     LONG TERM GOALS ( 12 visits)  Independent Home Exercise program  Return to normal activity     PATIENT GOAL  Not use walker    Pt. Education:  [x] Yes  [] No  [] Reviewed Prior HEP/Ed  Method of Education: [x] Verbal  [x] Demo  [] Written  Comprehension of Education:  [x] Verbalizes understanding. [x] Demonstrates understanding. [] Needs review. [] Demonstrates/verbalizes HEP/Ed previously given. Plan: [x] Continue with current plan of care towards goals.         Time In: 3:55 pm          Time Out: 4:50 pm    Electronically signed by:  Kirby Ojeda PTA

## 2021-07-14 ENCOUNTER — HOSPITAL ENCOUNTER (OUTPATIENT)
Dept: PHYSICAL THERAPY | Facility: CLINIC | Age: 76
Setting detail: THERAPIES SERIES
Discharge: HOME | End: 2021-07-14
Payer: MEDICARE

## 2021-07-14 ENCOUNTER — OFFICE VISIT (OUTPATIENT)
Dept: SURGERY | Age: 76
End: 2021-07-14

## 2021-07-14 VITALS
HEIGHT: 63 IN | WEIGHT: 172 LBS | RESPIRATION RATE: 12 BRPM | OXYGEN SATURATION: 99 % | BODY MASS INDEX: 30.48 KG/M2 | HEART RATE: 86 BPM

## 2021-07-14 DIAGNOSIS — Z09 POSTOPERATIVE EXAMINATION: Primary | ICD-10-CM

## 2021-07-14 PROCEDURE — 97110 THERAPEUTIC EXERCISES: CPT

## 2021-07-14 PROCEDURE — 99024 POSTOP FOLLOW-UP VISIT: CPT | Performed by: PLASTIC SURGERY

## 2021-07-14 NOTE — PROGRESS NOTES
[address] [facility]  [facility] [address]       OFFICE POST-OP NOTE    Patient Name:  Anson Boast    :  1945    MRN:  [id number]  STATUS POST  Chief Complaint   Patient presents with    Knee Pain     Right        SUBJECTIVE  Patient seen and examined. Patient status post liposuction of the right knee. Her surgery was performed on 2021  Patient is here for follow-up. It is very happy we with the results. After liposuction      Before liposuction  PHYSICAL EXAM  Vital Signs:  Pulse 86   Resp 12   Ht 5' 3\" (1.6 m)   Wt 172 lb (78 kg)   SpO2 99%   BMI 30.47 kg/m²     Incisions: Patient is well healed. Skin:  No evidence of infection. Neurologic:  Alert & oriented x 3. ASSESSMENT   Diagnosis Orders   1. Postoperative examination         PLAN  Continue physical therapy. Patient may remove the knee immobilizer for physical therapy. Patient may apply compression as needed. She may move to [place] when ready. If the patient is still here in 4 weeks patient is to follow-up in 4 weeks.       Electronically signed by:  Jonathan Paz M.D.   2021

## 2021-07-14 NOTE — FLOWSHEET NOTE
[x] [address]    Outpatient Rehabilitation &  Therapy  [address] [place] [address]  P: [phone]  F: [phone]     Physical Therapy Daily Treatment Note    Date:  2021  Patient Name:  Radha Martell    :  1945  MRN: [id number]  Physician: James: [id number] Mix. MEDICARE              Eligibility Status:  Eligible     Secondary Insurance (if applicable)               Eligibility Status: n/a  DOS: 21  # of visits allowed/remaining: medical necessity  Source: Phone  Spoke Nichole Molina  Reference: [id number]  Auth: NPRe  Medical Diagnosis: S/P Liposuction of R knee w/ previous TKA      Rehab Codes: M17.11  Onset Date: 21            Visit# / total visits:      Cancels/No Shows: 1/0    Subjective:    Pain:  [x] Yes  [] No Location: R knee   Pain Rating: (0-10 scale) 0/10  Pain altered Tx:  [] No  [x] Yes  Action: Pt requested to hold sidelying ex today d/t increased hip soreness. Comments: Pt presents without knee immobilizer per [place] for PT and continues to deny having any knee pain. Objective:    Visit #5       Exercise Reps/ Time Weight/ Level Comments    NuStep 10m   x   Calf/HS Stretch 30\"x3   x          Mat       Glut. ,Quad, HS prema 10x10s        Heel slides 2x10 3#      Bridges x10      SAQ 2x10 3#      SL Clamshells 2x10      SL hip abd 2x10 3#      SLR 2x10 3#      LAQ 3x10 3#   x   Seated HS Curls 3x10 3#            Gym       Standing Marches x20  B UE upon wall hand rails x   Heel Raises 2x10   x   HS Curls 2x10 B   x   3 way hip x15 B Lime  x   TKE 2' plum  x   Step Ups - fwd/lat x20  4\"  x   Step Downs x20   x   Standing without UE support  3x30\" // bars, SBA  x   Balance Board 1' L2  x   tband sidestepping 2L  orange  x   Other: Gait training with SPC      Treatment Charges: Mins Units   []  Modalities     [x]  Ther Exercise 47 3   []  Manual Therapy     []  Ther Activities     []  Aquatics     []  Vasocompression     []  Other     Total Treatment time  47 3       Assessment: [x] Progressing toward goals. Continued with exercises per flow sheet above. Able to increase level of resistance band for 3 way hip with vc's provided on proper posture and reducing compensations. Cont to encourage challenging balance by decreasing UE support for standing therex; able to utilize fingertips for most versus gripping bars. Increased duration completed with standing ex without UE use this date; slight swaying observed however pt verbalized improvement and feeling more stable. Held gait training as pt continues to be fearful and requesting not to attempt today. No onset of sx post tx.     [] No change. [] Other:       SHORT TERM GOALS ( 8 visits)  Knee pain = 0  Knee ROM = WNL  Knee strength = 4+/5  Knee function: bend, walk, up/dn steps, squat w/o pain     LONG TERM GOALS ( 12 visits)  Independent Home Exercise program  Return to normal activity     PATIENT GOAL  Not use walker    Pt. Education:  [x] Yes  [] No  [] Reviewed Prior HEP/Ed  Method of Education: [x] Verbal  [x] Demo  [] Written  Comprehension of Education:  [x] Verbalizes understanding. [x] Demonstrates understanding. [] Needs review. [] Demonstrates/verbalizes HEP/Ed previously given. Plan: [x] Continue with current plan of care towards goals. Time In: 2:05 pm          Time Out: 3:06 pm    Electronically signed by:   Chloé Mallory

## 2021-07-19 ENCOUNTER — HOSPITAL ENCOUNTER (OUTPATIENT)
Dept: PHYSICAL THERAPY | Facility: CLINIC | Age: 76
Setting detail: THERAPIES SERIES
Discharge: HOME | End: 2021-07-19
Payer: MEDICARE

## 2021-07-19 PROCEDURE — 97110 THERAPEUTIC EXERCISES: CPT

## 2021-07-19 NOTE — FLOWSHEET NOTE
[x] [address]    Outpatient Rehabilitation &  Therapy  [address] [facility]  P: [phone]  F: [phone]     Physical Therapy Daily Treatment Note    Date:  2021  Patient Name:  Sammy Bautista    :  1945  MRN: [id number]  Physician: James: Michael Nugent MEDICARE              Eligibility Status:  Eligible     Secondary Insurance (if applicable)               Eligibility Status: n/a  DOS: 21  # of visits allowed/remaining: medical necessity  Source: Phone  Spoke Sharon Meehan  Reference: [id number]  Auth: NPRe  Medical Diagnosis: S/P Liposuction of R knee w/ previous TKA      Rehab Codes: M17.11  Onset Date: 21            Visit# / total visits:      Cancels/No Shows: 1/0    Subjective:    Pain:  [x] Yes  [] No Location: R knee   Pain Rating: (0-10 scale) 0/10  Pain altered Tx:  [] No  [x] Yes  Action: Pt requested to hold sidelying ex today d/t increased hip soreness. Comments: Pt arrives with no new complaints or concerns and stated she does not need to wear knee immobilizer \"at all anymore\". Objective:    Visit #8       Exercise Reps/ Time Weight/ Level Comments    NuStep 10m   x   Calf/HS Stretch 30\"x3   x          Mat       Glut. ,Quad, HS prema 10x10s        Heel slides 2x10 3#      Bridges x10      SAQ 2x10 3#      SL Clamshells 2x10      SL hip abd 2x10 3#      SLR 2x10 3#      LAQ 3x10 3#   x   Seated HS Curls 3x10 3#            Gym       Standing Marches x20  B UE upon wall hand rails x   Heel Raises 2x10      HS Curls 2x10 B   x   3 way hip x15 B Lime  x   TKE 2' plum  x   Step Ups - fwd/lat x20  4\" lateral 6\" fwd  x   Step Downs x20   x   Standing without UE support  3x30\" ea // bars, SBA  x   Balance Board 1' L2  x   tband sidestepping 3L  orange  x   Other: Gait training with SPC      Treatment Charges: Mins Units   []  Modalities     [x]  Ther Exercise 50 3   []  Manual Therapy     []  Ther Activities     []  Aquatics     [] Vasocompression     []  Other     Total Treatment time  50 3       Assessment: [x] Progressing toward goals. Continued with exercises per flow sheet above. Emphasis directed to improving stability and gait performance in order to progress to utilizing a SPC (cont to be fearful). Increased step height for fwd step ups along with laps ambulated with side stepping; good rhianna demonstrated. Encouraged pt to decrease B UE support throughout standing ex with improved tolerance and stability assessed. Pt fatigued post tx however denies any pain or discomfort. [] No change. [] Other:       SHORT TERM GOALS ( 8 visits)  Knee pain = 0  Knee ROM = WNL  Knee strength = 4+/5  Knee function: bend, walk, up/dn steps, squat w/o pain     LONG TERM GOALS ( 12 visits)  Independent Home Exercise program  Return to normal activity     PATIENT GOAL  Not use walker    Pt. Education:  [x] Yes  [] No  [] Reviewed Prior HEP/Ed  Method of Education: [x] Verbal  [x] Demo  [] Written  Comprehension of Education:  [x] Verbalizes understanding. [x] Demonstrates understanding. [] Needs review. [] Demonstrates/verbalizes HEP/Ed previously given. Plan: [x] Continue with current plan of care towards goals. Time In: 3:00 pm          Time Out: 4:00 pm    Electronically signed by:   Juliet [facility], [place]

## 2021-07-26 ENCOUNTER — HOSPITAL ENCOUNTER (OUTPATIENT)
Dept: PHYSICAL THERAPY | Facility: CLINIC | Age: 76
Setting detail: THERAPIES SERIES
Discharge: HOME | End: 2021-07-26
Payer: MEDICARE

## 2021-07-26 PROCEDURE — 97110 THERAPEUTIC EXERCISES: CPT

## 2021-07-26 NOTE — FLOWSHEET NOTE
[x] [address]    Outpatient Rehabilitation &  Therapy  [address] [facility]  P: [phone]  F: [phone]     Physical Therapy Daily Treatment Note    Date:  2021  Patient Name:  Evin Meyesr    :  1945  MRN: [id number]  Physician: James: Jennifer Roque MEDICARE              Eligibility Status: Raymundo Mendoza  Secondary Insurance (if applicable)               Eligibility Status: n/a  DOS: 21  # of visits allowed/remaining: medical necessity  Source: Phone  Spoke Joann Hagan  Reference: [id number]  Auth: NPRe  Medical Diagnosis: S/P Liposuction of R knee w/ previous TKA      Rehab Codes: M17.11  Onset Date: 21            Visit# / total visits:      Cancels/No Shows: 1/0    Subjective:    Pain:  [x] Yes  [] No Location: R knee   Pain Rating: (0-10 scale) 0/10  Pain altered Tx:  [] No  [x] Yes  Action: Pt requested to hold sidelying ex today d/t increased hip soreness. Comments: Hasn't been having any pain in her knee. States it feels better \"without all the wrapping. \"    Objective:    Visit #8       Exercise Reps/ Time Weight/ Level Comments    NuStep 10m   x   Calf/HS Stretch 30\"x3   x          Mat       Glut. ,Quad, HS prema 10x10s        Heel slides 2x10 3#      Bridges x10      SAQ 2x10 3#      SL Clamshells 2x10      SL hip abd 2x10 3#      SLR 2x10 3#      LAQ 3x10 3#      Seated HS Curls 3x10 3#            Gym       Standing Marches x20  B UE upon wall hand rails x   Heel Raises 2x10   x   HS Curls 2x10 B   x   3 way hip x15 B Lime  x   TKE 2' plum  x   Step Ups - fwd/lat x20  6\"  x   Step Downs x20 4\"  x   Standing without UE support  3x30\" ea // bars, SBA  x   Balance Board 1' L2  x   tband sidestepping 2L  lime  x   Other: Gait training with SPC - not today      Treatment Charges: Mins Units   []  Modalities     [x]  Ther Exercise 50 3   []  Manual Therapy     []  Ther Activities     []  Aquatics     []  Vasocompression     []  Other Total Treatment time  50 3       Assessment: [x] Progressing toward goals. Able to increase step height for lateral step ups and resistance for tband sidestepping. More fatigued with ex this date. Cues throughout session for upright posture and decreased UE support to challenge balance. [] No change. [] Other:       SHORT TERM GOALS ( 8 visits)  Knee pain = 0  Knee ROM = WNL  Knee strength = 4+/5  Knee function: bend, walk, up/dn steps, squat w/o pain     LONG TERM GOALS ( 12 visits)  Independent Home Exercise program  Return to normal activity     PATIENT GOAL  Not use walker    Pt. Education:  [x] Yes  [] No  [] Reviewed Prior HEP/Ed  Method of Education: [x] Verbal  [x] Demo  [] Written  Comprehension of Education:  [x] Verbalizes understanding. [x] Demonstrates understanding. [] Needs review. [] Demonstrates/verbalizes HEP/Ed previously given. Plan: [x] Continue with current plan of care towards goals.         Time In: 2:55 pm         Time Out: 3:50 pm    Electronically signed by:  Neeta Rios PTA

## 2021-07-30 ENCOUNTER — HOSPITAL ENCOUNTER (OUTPATIENT)
Dept: PHYSICAL THERAPY | Facility: CLINIC | Age: 76
Setting detail: THERAPIES SERIES
Discharge: HOME | End: 2021-07-30
Payer: MEDICARE

## 2021-07-30 PROCEDURE — 97110 THERAPEUTIC EXERCISES: CPT

## 2021-07-30 NOTE — FLOWSHEET NOTE
[x] [address]    Outpatient Rehabilitation &  Therapy  [address] [place] [address]  P: [phone]  F: [phone]     Physical Therapy Daily Treatment Note    Date:  2021  Patient Name:  David Dickens    :  1945  MRN: [id number]  Physician: James: Doreen Henriquez MEDICARE              Eligibility Status: [facility]  Secondary Insurance (if applicable)               Eligibility Status: n/a  DOS: 21  # of visits allowed/remaining: medical necessity  Source: Phone  Spoke Bladimir Carvajal  Reference: [id number]  Auth: NPRe  Medical Diagnosis: S/P Liposuction of R knee w/ previous TKA      Rehab Codes: M17.11  Onset Date: 21            Visit# / total visits: 10/12     Cancels/No Shows: 1/0    Subjective:    Pain:  [x] Yes  [] No Location: R knee   Pain Rating: (0-10 scale) 0/10  Pain altered Tx:  [] No  [x] Yes  Action: Pt requested to hold sidelying ex today d/t increased hip soreness. Comments: Pt reports minimal pain in the anterior knee but thinks it is from where the hardware is. Objective:    Visit #8       Exercise Reps/ Time Weight/ Level Comments    NuStep 10m   x   Calf/HS Stretch 30\"x3   x   Knee Flexion S 3x30\"  stool x   Mat       Glut. ,Quad, HS prema 10x10s        Heel slides 2x10 3#      Bridges x10      SAQ 2x10 3#      SL Clamshells 2x10      SL hip abd 2x10 3#      SLR 2x10 3#      LAQ 3x10 3#      Seated HS Curls 3x10 3#            Gym       Standing Marches x20 2# B UE upon wall hand rails x   Heel Raises 2x10   x   HS Curls 2x10 B 2#  x   3 way hip x15 B 2#  x   TKE 2' plum  x   Step Ups - fwd/lat x20  6\"  x   Step Downs x20 4\"  x   Standing without UE support  3x30\" ea // bars, SBA  x   Balance Board 1' L2  x   tband sidestepping 2L  lime  x   Other: Gait training with SPC - not today      Treatment Charges: Mins Units   []  Modalities     [x]  Ther Exercise 45 3   []  Manual Therapy     []  Ther Activities     []  Aquatics     [] Vasocompression     []  Other     Total Treatment time  45 3       Assessment: [x] Progressing toward goals. Added ankle weights to marches and hamstring curls. Added ankle weights to 3 way hip as well instead of tband with increased challenge noted per pt. Attempted to add mini squats however pt unable to perform with proper technique even after cues and demonstration therefore discontinued this date. No c/o increased pain post session only muscle fatigue. Pt scheduled for recheck with primary therapist at next visit. [] No change. [] Other:       SHORT TERM GOALS ( 8 visits)  Knee pain = 0  Knee ROM = WNL  Knee strength = 4+/5  Knee function: bend, walk, up/dn steps, squat w/o pain     LONG TERM GOALS ( 12 visits)  Independent Home Exercise program  Return to normal activity     PATIENT GOAL  Not use walker    Pt. Education:  [x] Yes  [] No  [] Reviewed Prior HEP/Ed  Method of Education: [x] Verbal  [x] Demo  [] Written  Comprehension of Education:  [x] Verbalizes understanding. [x] Demonstrates understanding. [] Needs review. [] Demonstrates/verbalizes HEP/Ed previously given. Plan: [x] Continue with current plan of care towards goals.         Time In: 2:55 pm         Time Out: 3:50 pm    Electronically signed by:  Winston Garcia PTA

## 2021-08-02 ENCOUNTER — HOSPITAL ENCOUNTER (OUTPATIENT)
Dept: PHYSICAL THERAPY | Facility: CLINIC | Age: 76
Setting detail: THERAPIES SERIES
Discharge: HOME | End: 2021-08-02
Payer: MEDICARE

## 2021-08-02 PROCEDURE — 97110 THERAPEUTIC EXERCISES: CPT

## 2021-08-06 ENCOUNTER — HOSPITAL ENCOUNTER (OUTPATIENT)
Dept: PHYSICAL THERAPY | Facility: CLINIC | Age: 76
Setting detail: THERAPIES SERIES
Discharge: HOME | End: 2021-08-06
Payer: MEDICARE

## 2021-08-06 PROCEDURE — 97110 THERAPEUTIC EXERCISES: CPT

## 2021-08-06 NOTE — FLOWSHEET NOTE
[x] [address]   Outpatient Rehabilitation &  Therapy  [address] [facility]  P: [phone]  F: [phone]     Physical Therapy Daily Treatment Note    Date:  2021  Patient Name:  Pina Mackay    :  1945  MRN: [id number]  Physician: James: Amber Penaloza MEDICARE              Eligibility Status:  Eligible     Secondary Insurance (if applicable)               Eligibility Status: n/a  DOS: 21  # of visits allowed/remaining: medical necessity  Source: Phone  Spoke Martín Lee  Reference: [id number]  Auth: NPRe  Medical Diagnosis: S/P Liposuction of R knee w/ previous TKA      Rehab Codes: M17.11  Onset Date: 21            Visit# / total visits:      Cancels/No Shows: 1/0    Subjective:    Pain:  [] Yes  [x] No Location: R knee   Pain Rating: (0-10 scale) 0/10  Pain altered Tx:  [] No  [x] Yes  Action:   Comments: Pt states her knee \"feels great\" today. Objective:    Visit #8       Exercise Reps/ Time Weight/ Level Comments    NuStep 10m   x   Calf/HS Stretch 30\"x3   x   Knee Flexion S 3x30\"  stool x          Mat       Heel slides 2x10 3#      SAQ 2x10 3#      SL Clamshells 2x10      SL hip abd 2x10 3#      SLR 2x10 3#      LAQ 3x10 3#             Gym       March Over Hurdles x20  B UE upon wall hand rails    3 way hip tband 2x10 lime  x   Step Ups - fwd x20  8\"  x   Step Ups - lat x20 6\"  x   Step Downs x20 4\"  x   Balance Board 2' L2  x   BOSU lunges 2x10  // bars, SBA x   tband sidestepping 2L  lime     TG squats/HR 2x10 L14  x   Other: Gait training with SPC       Treatment Charges: Mins Units   []  Modalities     [x]  Ther Exercise 45 3   []  Manual Therapy     []  Ther Activities     []  Aquatics     []  Vasocompression     []  Other     Total Treatment time  45 3       Assessment: [x] Progressing toward goals. Cont with ex per log above with good tolerance.  Frequent cues needed for upright posture and to avoid circumduction when completing step ups. Gait training completed with gait belt and CGA with pt feeling more comfortable and confident. Needs cues for heel strike and to keep eyes forward. [] No change. [] Other:       SHORT TERM GOALS ( 8 visits)  Knee pain = 0  Knee ROM = WNL  Knee strength = 4+/5  Knee function: bend, walk, up/dn steps, squat w/o pain     LONG TERM GOALS ( 12 visits)  Independent Home Exercise program  Return to normal activity     PATIENT GOAL  Not use walker    Pt. Education:  [x] Yes  [] No  [] Reviewed Prior HEP/Ed  Method of Education: [x] Verbal  [x] Demo  [] Written  Comprehension of Education:  [x] Verbalizes understanding. [x] Demonstrates understanding. [] Needs review. [] Demonstrates/verbalizes HEP/Ed previously given. Plan: [x] Continue with current plan of care towards goals.         Time In: 1:50 pm         Time Out: 2:50 pm    Electronically signed by:  Rahul Wong PTA

## 2021-08-09 ENCOUNTER — HOSPITAL ENCOUNTER (OUTPATIENT)
Dept: PHYSICAL THERAPY | Facility: CLINIC | Age: 76
Setting detail: THERAPIES SERIES
Discharge: HOME | End: 2021-08-09
Payer: MEDICARE

## 2021-08-09 PROCEDURE — 97110 THERAPEUTIC EXERCISES: CPT

## 2021-08-09 NOTE — FLOWSHEET NOTE
[x] [address]   Outpatient Rehabilitation &  Therapy  [facility]  P: [phone]  F: [phone]     Physical Therapy Daily Treatment Note    Date:  2021  Patient Name:  Mark Waggoner    :  1945  MRN: [id number]  Physician: James: [id number] [address]. MEDICARE              Eligibility Status:  Eligible     Secondary Insurance (if applicable)               Eligibility Status: n/a  DOS: 21  # of visits allowed/remaining: medical necessity  Source: Phone  Spoke Paulette Bishop  Reference: [id number]  Auth: NPRe  Medical Diagnosis: S/P Liposuction of R knee w/ previous TKA      Rehab Codes: M17.11  Onset Date: 21            Visit# / total visits:      Cancels/No Shows: 1/0    Subjective:  Pt reports less knee pain, min stiffness w/ inactivity, most difficulty noted w/ walking as she does not feel strong enough to amb w/o walker yet. Pain:  [] Yes  [x] No Location: R knee   Pain Rating: (0-10 scale) 0/10  Pain altered Tx:  [] No  [x] Yes  Action:   Comments:     Objective:    Visit #13       Exercise Reps/ Time Weight/ Level Comments    NuStep 10m   x   Calf/HS Stretch 30\"x3   x   Knee Flexion S 3x30\"  stool x          Mat       Heel slides 2x10 3#      SAQ 2x10 3#      SL Clamshells 2x10      SL hip abd 2x10 3#      SLR 2x10 3#      LAQ 3x10 3#             Gym       March Over Hurdles x20  B UE upon wall hand rails    3 way hip tband 2x10 lime  x   Step Ups - fwd x20  8\"  x   Step Ups - lat x20 6\"  x   Step Downs x20 4\"  x   Balance Board 2' L2  x   BOSU lunges 2x10  // bars, SBA x   PMT B HS curls 2x10 30#     TG squats/HR 2x10 16  x   Other: Gait training with SPC       Treatment Charges: Mins Units   []  Modalities     [x]  Ther Exercise 45 3   []  Manual Therapy     []  Ther Activities     []  Aquatics     []  Vasocompression     []  Other     Total Treatment time  45 3       Assessment: [x] Progressing toward goals. [] No change.      [x] Other: Pt noted most difficulty controlling independent loading of R knee when in unstable positions cont use of rails for support during weight bearing exercises. SHORT TERM GOALS ( 8 visits)  Knee pain = 0  Knee ROM = WNL  Knee strength = 4+/5  Knee function: bend, walk, up/dn steps, squat w/o pain     LONG TERM GOALS ( 12 visits)  Independent Home Exercise program  Return to normal activity     PATIENT GOAL  Not use walker    Pt. Education:  [x] Yes  [] No  [] Reviewed Prior HEP/Ed  Method of Education: [x] Verbal  [x] Demo  [] Written  Comprehension of Education:  [x] Verbalizes understanding. [x] Demonstrates understanding. [] Needs review. [] Demonstrates/verbalizes HEP/Ed previously given. Plan: [x] Continue with current plan of care towards goals.         Time In: 1:50 pm         Time Out: 2:50 pm    Electronically signed by:  Isidoro Bentley PT

## 2021-08-11 ENCOUNTER — HOSPITAL ENCOUNTER (OUTPATIENT)
Dept: PHYSICAL THERAPY | Facility: CLINIC | Age: 76
Setting detail: THERAPIES SERIES
Discharge: HOME | End: 2021-08-11
Payer: MEDICARE

## 2021-08-11 PROCEDURE — 97110 THERAPEUTIC EXERCISES: CPT

## 2021-08-11 NOTE — FLOWSHEET NOTE
[x] [address]    Outpatient Rehabilitation &  Therapy  [address] [facility]  P: [phone]  F: [phone]     Physical Therapy Daily Treatment Note    Date:  2021  Patient Name:  Joshua Alarcon    :  1945  MRN: [id number]  Physician: James: Luigi Nina              Eligibility Status:  Eligible     Secondary Insurance (if applicable)               Eligibility Status: n/a  DOS: 21  # of visits allowed/remaining: medical necessity  Source: Phone  Spoke Drew Sotelo  Reference: [id number]  Auth: NPRe  Medical Diagnosis: S/P Liposuction of R knee w/ previous TKA      Rehab Codes: M17.11  Onset Date: 21            Visit# / total visits:      Cancels/No Shows: 1/0    Subjective:  Pt arrives denying any pain today and stated stiffness is minimal. Non-compliant to HEP this past week d/t busy home schedule. Pain:  [] Yes  [x] No Location: R knee   Pain Rating: (0-10 scale) 0/10  Pain altered Tx:  [] No  [x] Yes  Action:   Comments:     Objective:    Visit #14       Exercise Reps/ Time Weight/ Level Comments    NuStep 10m   x   Calf/HS Stretch 30\"x3   x   Knee Flexion S 3x30\"  stool x          Mat       Heel slides 2x10 3#      SAQ 2x10 3#      SL Clamshells 2x10      SL hip abd 2x10 3#      SLR 2x10 3#      LAQ 3x10 3#             Gym       March Over Hurdles x20  B UE upon wall hand rails    3 way hip tband 2x10 lime  x   Step Ups - fwd x20  6\"  x   Step Ups - lat x20 6\"  x   Step Downs x20 4\"  x   Balance Board 2' L2 Held d/t time x   BOSU lunges 2x10  // bars, SBA Held d/t time x   PMT B HS curls 2x10 30# Held d/t time    TG squats/HR 2x10 17  x   Other: Gait training with SPC       Treatment Charges: Mins Units   []  Modalities     [x]  Ther Exercise 40 3   []  Manual Therapy     []  Ther Activities     []  Aquatics     []  Vasocompression     []  Other     Total Treatment time  40 3        Assessment: [x] Progressing toward goals. Continued with exercise program as noted above. Frequent vc's provided throughout 3 way hip ex as pt displayed ex trunk lean unto // bars. Improved postural stance and technique displayed with verbal corrections. Increased weight level of TG squats this date with good tolerance. Pt required increased time for completion d/t slowed manner and fatigue; held ex as noted secondary to time. Will cont to progress as tolerated. [] No change. [x] Other: Pt noted most difficulty controlling independent loading of R knee when in unstable positions cont use of rails for support during weight bearing exercises. SHORT TERM GOALS ( 8 visits)  Knee pain = 0  Knee ROM = WNL  Knee strength = 4+/5  Knee function: bend, walk, up/dn steps, squat w/o pain     LONG TERM GOALS ( 12 visits)  Independent Home Exercise program  Return to normal activity     PATIENT GOAL  Not use walker    Pt. Education:  [x] Yes  [] No  [] Reviewed Prior HEP/Ed  Method of Education: [x] Verbal  [x] Demo  [] Written  Comprehension of Education:  [x] Verbalizes understanding. [x] Demonstrates understanding. [] Needs review. [] Demonstrates/verbalizes HEP/Ed previously given. Plan: [x] Continue with current plan of care towards goals. Time 2:05 pm         Time Out: 3:00 pm    Electronically signed by:   Chloé Pierre

## 2021-08-13 ENCOUNTER — HOSPITAL ENCOUNTER (OUTPATIENT)
Dept: PHYSICAL THERAPY | Facility: CLINIC | Age: 76
Setting detail: THERAPIES SERIES
Discharge: HOME | End: 2021-08-13
Payer: MEDICARE

## 2021-08-13 PROCEDURE — 97110 THERAPEUTIC EXERCISES: CPT

## 2021-08-13 NOTE — FLOWSHEET NOTE
[x] [address]    Outpatient Rehabilitation &  Therapy  [address] [facility]  P: [phone]  F: [phone]     Physical Therapy Daily Treatment Note    Date:  2021  Patient Name:  Nicci Downey    :  1945  MRN: [id number]  Physician: James: Gladystine Deis MEDICARE              Eligibility Status:  Eligible     Secondary Insurance (if applicable)               Eligibility Status: n/a  DOS: 21  # of visits allowed/remaining: medical necessity  Source: Phone  Spoke Smiley Schroeder  Reference: [id number]  Auth: NPRe  Medical Diagnosis: S/P Liposuction of R knee w/ previous TKA      Rehab Codes: M17.11  Onset Date: 21            Visit# / total visits: 15/24     Cancels/No Shows: 1/0    Subjective:      Pain:  [] Yes  [x] No Location: R knee   Pain Rating: (0-10 scale) 0/10  Pain altered Tx:  [] No  [x] Yes  Action:   Comments:  Pt reports no pain or complaints at arrival.     Objective:    Visit #15       Exercise Reps/ Time Weight/ Level Comments    NuStep 10m   x   Calf/HS Stretch 30\"x3   x   Knee Flexion S 3x30\"  stool x          Mat       Heel slides 2x10 3#      SAQ 2x10 3#      SL Clamshells 2x10      SL hip abd 2x10 3#      SLR 2x10 3#      LAQ 3x10 3#             Gym       March Over Hurdles x20  B UE upon wall hand rails    3 way hip tband 2x10 lime  x   Step Ups - fwd x20  6\"  x   Step Ups - lat x20 6\"  x   Step Downs x20 4\"  x   Balance Board 2' L2  x   BOSU lunges 2x10  // bars, SBA     PMT B HS curls 2x10 30#  x   TG squats/HR 2x10 17     Other: Gait training with SPC       Treatment Charges: Mins Units   []  Modalities     [x]  Ther Exercise 45 3   []  Manual Therapy     []  Ther Activities     []  Aquatics     []  Vasocompression     []  Other     Total Treatment time  45 3        Assessment: [x] Progressing toward goals. [] No change. [x] Other: Cont to rely heavily on UE support during WB ex.  Cues throughout session for upright posture and for increased knee flexion with ambulation. Improvement noted with confidence in gait training with SPC, able to complete SBA with gait belt donned for safety. SHORT TERM GOALS ( 8 visits)  Knee pain = 0  Knee ROM = WNL  Knee strength = 4+/5  Knee function: bend, walk, up/dn steps, squat w/o pain     LONG TERM GOALS ( 12 visits)  Independent Home Exercise program  Return to normal activity     PATIENT GOAL  Not use walker    Pt. Education:  [x] Yes  [] No  [] Reviewed Prior HEP/Ed  Method of Education: [x] Verbal  [x] Demo  [] Written  Comprehension of Education:  [x] Verbalizes understanding. [x] Demonstrates understanding. [] Needs review. [] Demonstrates/verbalizes HEP/Ed previously given. Plan: [x] Continue with current plan of care towards goals.         Time 1:00 pm         Time Out: 2:00 pm    Electronically signed by:  Aidee Julien PTA

## 2021-08-16 ENCOUNTER — HOSPITAL ENCOUNTER (OUTPATIENT)
Dept: PHYSICAL THERAPY | Facility: CLINIC | Age: 76
Setting detail: THERAPIES SERIES
Discharge: HOME | End: 2021-08-16
Payer: MEDICARE

## 2021-08-16 PROCEDURE — 97110 THERAPEUTIC EXERCISES: CPT

## 2021-08-16 NOTE — FLOWSHEET NOTE
[x] [address]    Outpatient Rehabilitation &  Therapy  [address] [facility]  P: [phone]  F: [phone]     Physical Therapy Daily Treatment Note    Date:  2021  Patient Name:  Roma Singh    :  1945  MRN: [id number]  Physician: James: Kasi Ludwig MEDICARE              Eligibility Status:  Eligible     Secondary Insurance (if applicable)               Eligibility Status: n/a  DOS: 21  # of visits allowed/remaining: medical necessity  Source: Phone  Spoke Brain Rivera  Reference: [id number]  Auth: NPRe  Medical Diagnosis: S/P Liposuction of R knee w/ previous TKA      Rehab Codes: M17.11  Onset Date: 21            Visit# / total visits: 15/24     Cancels/No Shows: 1/0    Subjective:  Pt reports cont R knee stiffness, weakness, reliant on use of walker for longer distances  Pain:  [] Yes  [x] No Location: R knee   Pain Rating: (0-10 scale) 0/10  Pain altered Tx:  [] No  [x] Yes  Action:   Comments:     Objective:    Visit #16       Exercise Reps/ Time Weight/ Level Comments    NuStep 10m   x   Calf/HS Stretch 30\"x3   x   Knee Flexion S 3x30\"  stool x          Mat       Heel slides 2x10 3#      SAQ 2x10 3#      SL Clamshells 2x10      SL hip abd 2x10 3#      SLR 2x10 3#      LAQ 3x10 3#             Gym       March Over Hurdles x20  B UE upon wall hand rails    Tband 3 way hip 2x10 lime  x   Step Ups - fwd x20  6\"  x   Step Ups - lat x20 6\"  x   Step Downs x20 4\"  x   Balance Board 2' L2  x   BOSU lunges 2x10  // bars, SBA     PMT B HS curls 2x10 40#  x   TG squats/HR 2x10 L18     Other: Gait training with SPC       Treatment Charges: Mins Units   []  Modalities     [x]  Ther Exercise 45 3   []  Manual Therapy     []  Ther Activities     []  Aquatics     []  Vasocompression     []  Other     Total Treatment time  45 3        Assessment: [x] Progressing toward goals. [] No change.      [x] Other: Pt able to complete progress resistive exercises w/ min knee soreness afterwards      SHORT TERM GOALS ( 8 visits)  Knee pain = 0  Knee ROM = WNL  Knee strength = 4+/5  Knee function: bend, walk, up/dn steps, squat w/o pain     LONG TERM GOALS ( 12 visits)  Independent Home Exercise program  Return to normal activity     PATIENT GOAL  Not use walker    Pt. Education:  [x] Yes  [] No  [] Reviewed Prior HEP/Ed  Method of Education: [x] Verbal  [x] Demo  [] Written  Comprehension of Education:  [x] Verbalizes understanding. [x] Demonstrates understanding. [] Needs review. [] Demonstrates/verbalizes HEP/Ed previously given. Plan: [x] Continue with current plan of care towards goals.         Time 1:00 pm         Time Out: 2:00 pm    Electronically signed by:  Augustus Nicole PT

## 2021-08-18 ENCOUNTER — HOSPITAL ENCOUNTER (OUTPATIENT)
Dept: PHYSICAL THERAPY | Facility: CLINIC | Age: 76
Setting detail: THERAPIES SERIES
Discharge: HOME | End: 2021-08-18
Payer: MEDICARE

## 2021-08-18 PROCEDURE — 97110 THERAPEUTIC EXERCISES: CPT

## 2021-08-18 NOTE — FLOWSHEET NOTE
[x] [address]    Outpatient Rehabilitation &  Therapy  [address] [place] [address]  P: [phone]  F: [phone]     Physical Therapy Daily Treatment Note    Date:  2021  Patient Name:  Roma Singh    :  1945  MRN: [id number]  Physician: James: Amarjit Hollis              Eligibility Status:  Eligible     Secondary Insurance (if applicable)               Eligibility Status: n/a  DOS: 21  # of visits allowed/remaining: medical necessity  Source: Phone  Spoke Brain Rivera  Reference: [id number]  Auth: NPRe  Medical Diagnosis: S/P Liposuction of R knee w/ previous TKA      Rehab Codes: M17.11  Onset Date: 21            Visit# / total visits:      Cancels/No Shows: 1/0    Subjective:  Pt arrived reporting slight soreness after previous session however \"it went away\". No change with stiffness and cont to utilize walker with ambulation and daily ADL's. Pain:  [] Yes  [x] No Location: R knee   Pain Rating: (0-10 scale) 0/10  Pain altered Tx:  [] No  [x] Yes  Action:   Comments:     Objective:    Visit #17       Exercise Reps/ Time Weight/ Level Comments    NuStep 10m   x   Calf/HS Stretch 30\"x3   x   Knee Flexion S 3x30\"  stool x          Mat       Heel slides 2x10 3#      SAQ 2x10 3#      SL Clamshells 2x10      SL hip abd 2x10 3#      SLR 2x10 3#      LAQ 3x10 3#             Gym       March Over Hurdles x20  B UE upon wall hand rails    Tband 3 way hip 2x10 blue  x   Step Ups - fwd x20  6\"  x   Step Ups - lat x20 6\"  x   Step Downs x20 4\"  x   Balance Board 2' L2  x   BOSU lunges 2x10  // bars, SBA     PMT B HS curls 25x 40#  x   TG squats/HR 2x10 L18     Other: Gait training with SPC       Treatment Charges: Mins Units   []  Modalities     [x]  Ther Exercise 45 3   []  Manual Therapy     []  Ther Activities     []  Aquatics     []  Vasocompression     []  Other     Total Treatment time  45 3        Assessment: [x] Progressing toward goals. Initiated session with Nu Step for warmup prior to stretching. Followed with standing program to enhance stability and strengthening with improved tolerance. Able to increase resistance for 3 way hip with cueing required on posture; encouraged pt to reduce UE support to increase stability in preparation for a less restrictive AD. Slight increase in reps tolerated for PMT HS Curls; pt fatigued after and reporting muscle feels \"worked out\" but denies any sig soreness. Will cont to progress as tolerated. [] No change. [] Other:       SHORT TERM GOALS ( 8 visits)  Knee pain = 0  Knee ROM = WNL  Knee strength = 4+/5  Knee function: bend, walk, up/dn steps, squat w/o pain     LONG TERM GOALS ( 12 visits)  Independent Home Exercise program  Return to normal activity     PATIENT GOAL  Not use walker    Pt. Education:  [x] Yes  [] No  [] Reviewed Prior HEP/Ed  Method of Education: [x] Verbal  [x] Demo  [] Written  Comprehension of Education:  [x] Verbalizes understanding. [x] Demonstrates understanding. [] Needs review. [] Demonstrates/verbalizes HEP/Ed previously given. Plan: [x] Continue with current plan of care towards goals. Time 4:30 pm         Time Out: 5:25 pm    Electronically signed by:   Chloé Silvestre

## 2021-08-20 ENCOUNTER — APPOINTMENT (OUTPATIENT)
Dept: PHYSICAL THERAPY | Facility: CLINIC | Age: 76
End: 2021-08-20
Payer: MEDICARE

## 2021-08-23 ENCOUNTER — HOSPITAL ENCOUNTER (OUTPATIENT)
Dept: PHYSICAL THERAPY | Facility: CLINIC | Age: 76
Setting detail: THERAPIES SERIES
Discharge: HOME | End: 2021-08-23
Payer: MEDICARE

## 2021-08-23 PROCEDURE — 97110 THERAPEUTIC EXERCISES: CPT

## 2021-08-23 NOTE — FLOWSHEET NOTE
[x] [address]    Outpatient Rehabilitation &  Therapy  [address] [facility]  P: [phone]  F: [phone]     Physical Therapy Daily Treatment Note    Date:  2021  Patient Name:  Juris Kocher    :  1945  MRN: [id number]  Physician: James: Antony Barton MEDICARE              Eligibility Status:  Eligible     Secondary Insurance (if applicable)               Eligibility Status: n/a  DOS: 21  # of visits allowed/remaining: medical necessity  Source: Phone  Spoke Carlton Villagran  Reference: [id number]  Auth: NPRe  Medical Diagnosis: S/P Liposuction of R knee w/ previous TKA      Rehab Codes: M17.11  Onset Date: 21            Visit# / total visits:      Cancels/No Shows: 1/0    Subjective:  Pt arrived with no new complaints or concerns. Denies any soreness post previous session. Pain:  [] Yes  [x] No Location: R knee   Pain Rating: (0-10 scale) 0/10  Pain altered Tx:  [] No  [x] Yes  Action:   Comments:     Objective:    Visit #17       Exercise Reps/ Time Weight/ Level Comments    NuStep 10m   x   Calf/HS Stretch 30\"x3   x   Knee Flexion S 3x30\"  stool x          Mat       Heel slides 2x10 3#      SAQ 2x10 3#      SL Clamshells 2x10      SL hip abd 2x10 3#      SLR 2x10 3#      LAQ 3x10 3#             Gym       March Over Hurdles x20  B UE upon wall hand rails    Tband 3 way hip 2x10 blue  x   Step Ups - fwd x20  6\"  x   Step Ups - lat x20 6\"  x   Step Downs x20 4\"  x   Balance Board 2' L2     BOSU lunges 2x10  // bars, SBA     PMT B HS curls 3x10 45#  x   TG squats/HR 2x10 L18     Other: Gait training with SPC       Treatment Charges: Mins Units   []  Modalities     [x]  Ther Exercise 45 3   []  Manual Therapy     []  Ther Activities     []  Aquatics     []  Vasocompression     []  Other     Total Treatment time  45 3        Assessment: [x] Progressing toward goals. Initiated session with Nu Step for warmup to enhance ROM and aerobic tolerance. Continued with stretching and standing exercises to further advance strength and stability of the LE's; improved balance assessed while completing stretches in the SL stance. Reduced cueing required for proper technique this date. Able to increase weight and reps for PMT HS curls with pt noting muscle fatigue post completion however denies any pain. Will cont to progress as tolerated. [] No change. [] Other:       SHORT TERM GOALS ( 8 visits)  Knee pain = 0  Knee ROM = WNL  Knee strength = 4+/5  Knee function: bend, walk, up/dn steps, squat w/o pain     LONG TERM GOALS ( 12 visits)  Independent Home Exercise program  Return to normal activity     PATIENT GOAL  Not use walker    Pt. Education:  [x] Yes  [] No  [] Reviewed Prior HEP/Ed  Method of Education: [x] Verbal  [x] Demo  [] Written  Comprehension of Education:  [x] Verbalizes understanding. [x] Demonstrates understanding. [] Needs review. [] Demonstrates/verbalizes HEP/Ed previously given. Plan: [x] Continue with current plan of care towards goals. Time 3:30 pm         Time Out: 4:25 pm    Electronically signed by:   Chloé Horta

## 2021-08-26 ENCOUNTER — HOSPITAL ENCOUNTER (OUTPATIENT)
Dept: PHYSICAL THERAPY | Facility: CLINIC | Age: 76
Setting detail: THERAPIES SERIES
Discharge: HOME | End: 2021-08-26
Payer: MEDICARE

## 2021-08-26 PROCEDURE — 97110 THERAPEUTIC EXERCISES: CPT

## 2021-08-26 NOTE — FLOWSHEET NOTE
[x] [address]    Outpatient Rehabilitation &  Therapy  1500 [facility]  P: [phone]  F: [phone]     Physical Therapy Daily Treatment Note    Date:  2021  Patient Name:  Carlee Tripp    :  1945  MRN: [id number]  Physician: James: Ricky Phoenix MEDICARE              Eligibility Status:  Eligible     Secondary Insurance (if applicable)               Eligibility Status: n/a  DOS: 21  # of visits allowed/remaining: medical necessity  Source: Phone  Spoke Shae Finney  Reference: [id number]  Auth: NPRe  Medical Diagnosis: S/P Liposuction of R knee w/ previous TKA      Rehab Codes: M17.11  Onset Date: 21            Visit# / total visits:      Cancels/No Shows: 10    Subjective:  Pt arrived stating she is \"sore from sitting in the car to [place], [place]\". Pt reported she has her R knee wrapped today. Pain:  [] Yes  [x] No Location: R knee   Pain Rating: (0-10 scale) 0/10  Pain altered Tx:  [] No  [x] Yes  Action:   Comments:     Objective:    Visit #17       Exercise Reps/ Time Weight/ Level Comments    NuStep 10m   x   Calf/HS Stretch 30\"x3   x   Knee Flexion S 3x30\"  stool x          Mat       Heel slides 2x10 3#      SAQ 2x10 3#      SL Clamshells 2x10      SL hip abd 2x10 3#      SLR 2x10 3#      LAQ 3x10 3#             Gym       March Over Hurdles 5L  B UE upon wall hand rails x   Tband 3 way hip 2x10 blue  x   Step Ups - fwd x20  6\"  x   Step Ups - lat x20 6\"  x   Step Downs x20 4\"  x   Balance Board 2' L2     BOSU lunges 2x10  // bars, SBA     PMT B HS curls 3x10 45#  x   TG squats/HR 2x10 L18     Other: Gait training with SPC       Treatment Charges: Mins Units   []  Modalities     [x]  Ther Exercise 48 3   []  Manual Therapy     []  Ther Activities     []  Aquatics     []  Vasocompression     []  Other     Total Treatment time  48 3        Assessment: [x] Progressing toward goals.  Continued with exercise program as noted above. Focused on balance and strengthening with pt able to reduce UE support with marching over hurdles. Verbal cues provided to prevent valgus collapse of knees with step downs with improved carryover. Did not further progress this date d/t increased fatigue and soreness from traveling. Discussed gait training with SPC in upcoming sessions with pt agreeable. Will cont to progress as tolerated. [] No change. [] Other:       SHORT TERM GOALS ( 8 visits)  Knee pain = 0  Knee ROM = WNL  Knee strength = 4+/5  Knee function: bend, walk, up/dn steps, squat w/o pain     LONG TERM GOALS ( 12 visits)  Independent Home Exercise program  Return to normal activity     PATIENT GOAL  Not use walker    Pt. Education:  [x] Yes  [] No  [] Reviewed Prior HEP/Ed  Method of Education: [x] Verbal  [x] Demo  [] Written  Comprehension of Education:  [x] Verbalizes understanding. [x] Demonstrates understanding. [] Needs review. [] Demonstrates/verbalizes HEP/Ed previously given. Plan: [x] Continue with current plan of care towards goals. Time 3:30 pm         Time Out: 4:32 pm    Electronically signed by:   Beth Cox [place]

## 2021-08-27 ENCOUNTER — APPOINTMENT (OUTPATIENT)
Dept: PHYSICAL THERAPY | Facility: CLINIC | Age: 76
End: 2021-08-27
Payer: MEDICARE

## 2021-08-27 ENCOUNTER — OFFICE VISIT (OUTPATIENT)
Dept: SURGERY | Age: 76
End: 2021-08-27

## 2021-08-27 VITALS
BODY MASS INDEX: 30.48 KG/M2 | RESPIRATION RATE: 12 BRPM | HEIGHT: 63 IN | OXYGEN SATURATION: 99 % | HEART RATE: 84 BPM | WEIGHT: 172 LBS

## 2021-08-27 DIAGNOSIS — Z96.651 STATUS POST TOTAL RIGHT KNEE REPLACEMENT: ICD-10-CM

## 2021-08-27 DIAGNOSIS — Z09 POSTOPERATIVE EXAMINATION: ICD-10-CM

## 2021-08-27 DIAGNOSIS — R22.41 MASS OF RIGHT KNEE: Primary | ICD-10-CM

## 2021-08-27 PROCEDURE — 99024 POSTOP FOLLOW-UP VISIT: CPT | Performed by: PLASTIC SURGERY

## 2021-08-27 NOTE — PROGRESS NOTES
[address] [place] [address] [facility]  [facility] [address]       OFFICE POST-OP NOTE    Patient Name:  Eddie Kidd    :  1945    MRN:  [id number]  STATUS POST  Chief Complaint   Patient presents with    Post-Op Check     right knee liposuction DOS 21       SUBJECTIVE  Patient seen and examined. Patient status post liposuction of the right knee. Her surgery was performed on 2021  Patient is here for follow-up. It is very happy we with the results. After liposuction      Before liposuction  PHYSICAL EXAM  Vital Signs:  Pulse 84   Resp 12   Ht 5' 3\" (1.6 m)   Wt 172 lb (78 kg)   SpO2 99%   BMI 30.47 kg/m²     Incisions: Patient is well healed. Skin:  No evidence of infection. Neurologic:  Alert & oriented x 3. ASSESSMENT   Diagnosis Orders   1. Mass of right knee     2. Postoperative examination     3. Status post total right knee replacement  External Referral To Physical Therapy       PLAN  Patient is moving to [place]. Recommend continue physical therapy in [place].     Electronically signed by:  Christel Martini M.D.   2021

## 2021-08-30 ENCOUNTER — HOSPITAL ENCOUNTER (OUTPATIENT)
Dept: PHYSICAL THERAPY | Facility: CLINIC | Age: 76
Setting detail: THERAPIES SERIES
Discharge: HOME | End: 2021-08-30
Payer: MEDICARE

## 2021-08-30 PROCEDURE — 97110 THERAPEUTIC EXERCISES: CPT

## 2021-08-30 NOTE — FLOWSHEET NOTE
[x] [address]   Outpatient Rehabilitation &  Therapy  [facility]  P: [phone]  F: [phone]     Physical Therapy Daily Treatment Note    Date:  2021  Patient Name:  Sharmaine George    :  1945  MRN: [id number]  Physician: James: Lara Sandhu MEDICARE              Eligibility Status:  Eligible     Secondary Insurance (if applicable)               Eligibility Status: n/a  DOS: 21  # of visits allowed/remaining: medical necessity  Source: Phone  Spoke Alida Ahumada  Reference: [id number]  Auth: NPRe  Medical Diagnosis: S/P Liposuction of R knee w/ previous TKA      Rehab Codes: M17.11  Onset Date: 21            Visit# / total visits:      Cancels/No Shows: 1/0    Subjective:  Pt states she feels better than she did after last appt since she had just returned from long drive from [facility], hopeful that she will do better today      Pain:  [] Yes  [x] No Location: R knee   Pain Rating: (0-10 scale) 0/10  Pain altered Tx:  [] No  [x] Yes  Action:   Comments:     Objective:    Visit #18       Exercise Reps/ Time Weight/ Level Comments    NuStep 10m   x   Calf/HS Stretch 30\"x3   x   Knee Flexion S 3x30\"  stool x          Mat       Heel slides 2x10 3#      SAQ 2x10 3#      SL Clamshells 2x10      SL hip abd 2x10 3#      SLR 2x10 3#      LAQ 3x10 3#             Gym       March Over Hurdles 5L  B UE upon wall hand rails x   Tband 3 way hip 2x10 blue  x   Step Ups - fwd x20  6\"  x   Step Ups - lat x20 6\"  x   Step Downs x20 4\"  x   Balance Board 2' L2     BOSU lunges 2x10  // bars, SBA     PMT B HS curls 3x10 45#  x   TG squats/HR 2x10 L19     Other: Gait training with SPC       Treatment Charges: Mins Units   []  Modalities     [x]  Ther Exercise 45 3   []  Manual Therapy     []  Ther Activities     []  Aquatics     []  Vasocompression     []  Other     Total Treatment time  45 3        Assessment: [x] Progressing toward goals.        [] No change. [x] Other:  Pt noted most challenge w/ coordinating hip, knee, ankle flexion to negotiate jaye step overs, relies on bars to support arms during step up/downs, but no increased knee pain      SHORT TERM GOALS ( 8 visits)  Knee pain = 0  Knee ROM = WNL  Knee strength = 4+/5  Knee function: bend, walk, up/dn steps, squat w/o pain     LONG TERM GOALS ( 12 visits)  Independent Home Exercise program  Return to normal activity     PATIENT GOAL  Not use walker    Pt. Education:  [x] Yes  [] No  [] Reviewed Prior HEP/Ed  Method of Education: [x] Verbal  [x] Demo  [] Written  Comprehension of Education:  [x] Verbalizes understanding. [x] Demonstrates understanding. [] Needs review. [] Demonstrates/verbalizes HEP/Ed previously given. Plan: [x] Continue with current plan of care towards goals.         Time 3:30 pm         Time Out: 4:32 pm    Electronically signed by:  Macie Campbell, PT

## 2021-09-01 ENCOUNTER — HOSPITAL ENCOUNTER (OUTPATIENT)
Dept: PHYSICAL THERAPY | Facility: CLINIC | Age: 76
Setting detail: THERAPIES SERIES
Discharge: HOME | End: 2021-09-01
Payer: MEDICARE

## 2021-09-01 PROCEDURE — 97110 THERAPEUTIC EXERCISES: CPT

## 2021-09-01 NOTE — FLOWSHEET NOTE
[x] [address]   Outpatient Rehabilitation &  Therapy  [address] [facility]  P: [phone]  F: [phone]     Physical Therapy Daily Treatment Note    Date:  2021  Patient Name:  Regina Griffin    :  1945  MRN: [id number]  Physician: James: Brandon Shukla MEDICARE              Eligibility Status:  Eligible     Secondary Insurance (if applicable)               Eligibility Status: n/a  DOS: 21  # of visits allowed/remaining: medical necessity  Source: Phone  Spoke Tomasa Obregon  Reference: [id number]  Auth: NPRe  Medical Diagnosis: S/P Liposuction of R knee w/ previous TKA      Rehab Codes: M17.11  Onset Date: 21            Visit# / total visits:      Cancels/No Shows: 1/0    Subjective:  Pt arrived with no new complaints or concerns. Minimal HEP completion. Pain:  [] Yes  [x] No Location: R knee   Pain Rating: (0-10 scale) 0/10  Pain altered Tx:  [] No  [x] Yes  Action:   Comments:     Objective:    Visit #19       Exercise Reps/ Time Weight/ Level Comments    NuStep 10m   x   Calf/HS Stretch 30\"x3   x   Knee Flexion S 3x30\"  stool x          Mat       Heel slides 2x10 3#      SAQ 2x10 3#      SL Clamshells 2x10      SL hip abd 2x10 3#      SLR 2x10 3#      LAQ 3x10 3#             Gym       March Over Hurdles 5L  B UE upon wall hand rails x   Tband 3 way hip 25x blue  x   Step Ups - fwd x25 6\"  x   Step Ups - lat x25 6\"  x   Step Downs x20 4\"  x   Balance Board 2' L2     BOSU lunges 2x10  // bars, SBA     PMT B HS curls 3x10 45# Held d/t time constraint    TG squats/HR 2x10 L19     Other: Gait training with SPC       Treatment Charges: Mins Units   []  Modalities     [x]  Ther Exercise 43 3   []  Manual Therapy     []  Ther Activities     []  Aquatics     []  Vasocompression     []  Other     Total Treatment time  43 3        Assessment: [x] Progressing toward goals. [] No change.      [x] Other:  Continued with exercise program as noted above with fair tolerance. Increased reps for 3 way hip with frequent cueing required to maintain erect posture to correct forward lean onto // bars. Increased reps for step ups (fwd and lat) to enhance strengthening and balance; encouraged pt to decrease UE support to provide incr challenge with pt unable to perform unilaterally. Ended session with marching over hurdles for gait improvement with pt displaying difficulty with LE and ankle flexion to properly clear hurdles; improved ability with max cues. Pt fatigued post session however denied any onset of pain. SHORT TERM GOALS ( 8 visits)  Knee pain = 0  Knee ROM = WNL  Knee strength = 4+/5  Knee function: bend, walk, up/dn steps, squat w/o pain     LONG TERM GOALS ( 12 visits)  Independent Home Exercise program  Return to normal activity     PATIENT GOAL  Not use walker    Pt. Education:  [x] Yes  [] No  [] Reviewed Prior HEP/Ed  Method of Education: [x] Verbal  [x] Demo  [] Written  Comprehension of Education:  [x] Verbalizes understanding. [x] Demonstrates understanding. [] Needs review. [] Demonstrates/verbalizes HEP/Ed previously given. Plan: [x] Continue with current plan of care towards goals. Time: 2:05 pm         Time Out: 2:55 pm    Electronically signed by:   Chloé Crowley

## 2021-09-03 ENCOUNTER — HOSPITAL ENCOUNTER (OUTPATIENT)
Dept: PHYSICAL THERAPY | Facility: CLINIC | Age: 76
Setting detail: THERAPIES SERIES
Discharge: HOME | End: 2021-09-03
Payer: MEDICARE

## 2021-09-03 PROCEDURE — 97110 THERAPEUTIC EXERCISES: CPT

## 2021-09-03 NOTE — FLOWSHEET NOTE
[x] [address]    Outpatient Rehabilitation &  Therapy  [address] [facility]  P: [phone]  F: [phone]     Physical Therapy Daily Treatment Note    Date:  9/3/2021  Patient Name:  Radha Martell    :  1945  MRN: [id number]  Physician: James: Deangelo Hunter MEDICARE              Eligibility Status: [place]  Secondary Insurance (if applicable)               Eligibility Status: n/a  DOS: 21  # of visits allowed/remaining: medical necessity  Source: Phone  Spoke Nichloe Molina  Reference: [id number]  Auth: NPRe  Medical Diagnosis: S/P Liposuction of R knee w/ previous TKA      Rehab Codes: M17.11  Onset Date: 21            Visit# / total visits:      Cancels/No Shows: 1/0    Subjective:      Pain:  [] Yes  [x] No Location: R knee   Pain Rating: (0-10 scale) 0/10  Pain altered Tx:  [] No  [x] Yes  Action:   Comments:  Pt rates no pain or issue at arrival. Notices some discomfort when driving. Objective:    Visit #20       Exercise Reps/ Time Weight/ Level Comments    NuStep 10m   x   Calf/HS Stretch 30\"x3   x   Knee Flexion S 3x30\"  stool x          Mat       Heel slides 2x10 3#      SAQ 2x10 3#      SL Clamshells 2x10      SL hip abd 2x10 3#      SLR 2x10 3#      LAQ 3x10 3#             Gym       March Over Hurdles 2L 12\" B UE upon hand rails x   Tband 3 way hip 25x blue  x   Step Ups - fwd x25 6\"  x   Step Ups - lat x25 6\"  x   Step Downs x25 4\"  x   Balance Board 2' L2     BOSU lunges 2x10  // bars, SBA  x   PMT B HS curls 3x10 45# Held d/t time constraint    TG squats/HR 2x10 L19     Other: Gait training with SPC - not today      Treatment Charges: Mins Units   []  Modalities     [x]  Ther Exercise 45 3   []  Manual Therapy     []  Ther Activities     []  Aquatics     []  Vasocompression     []  Other     Total Treatment time  45 3        Assessment: [x] Progressing toward goals. [] No change.      [x] Other:  Cont to have difficulty with knee flexion and ankle DF during hurdle march. Cues for upright posture and TKE with 3 way hip. Fatigued with ex but no pain. SHORT TERM GOALS ( 8 visits)  Knee pain = 0  Knee ROM = WNL  Knee strength = 4+/5  Knee function: bend, walk, up/dn steps, squat w/o pain     LONG TERM GOALS ( 12 visits)  Independent Home Exercise program  Return to normal activity     PATIENT GOAL  Not use walker    Pt. Education:  [x] Yes  [] No  [] Reviewed Prior HEP/Ed  Method of Education: [x] Verbal  [] Demo  [] Written  Comprehension of Education:  [x] Verbalizes understanding. [] Demonstrates understanding. [] Needs review. [] Demonstrates/verbalizes HEP/Ed previously given. Plan: [x] Continue with current plan of care towards goals.         Time: 2:00 pm         Time Out: 2:55 pm    Electronically signed by:  Anjelica Vasquez PTA

## 2021-09-07 ENCOUNTER — HOSPITAL ENCOUNTER (OUTPATIENT)
Dept: PHYSICAL THERAPY | Facility: CLINIC | Age: 76
Setting detail: THERAPIES SERIES
Discharge: HOME | End: 2021-09-07
Payer: MEDICARE

## 2021-09-07 NOTE — FLOWSHEET NOTE
[] [facility]) - [facility] &  Therapy  [address]    [phone]  F: [phone]   [] [id number]  [facility] [address]   [address]  P: [phone]  F: [phone]  [] [address] &  Therapy  [address] [facility]  P: [phone]  F: [phone] [] [address]  P: [phone]  F: [phone]  [] [address]  [facility]   [address]   [place]: [phone]  F: [phone]   [] [address]  [place]: [phone]   F: [phone]     Physical Therapy Cancel/No Show note    Date: 2021  Patient: Sammy Bautista  : 1945  MRN: [id number]    Cancels/No Shows to date:     For today's appointment patient:    [x]  Cancelled    [] Rescheduled appointment    [] No-show     Reason given by patient:    []  Patient ill    []  Conflicting appointment    [] No transportation      [] Conflict with work    [] No reason given    [] Weather related    [] [id number]    [] Other:      Comments:  Patient is moving      [] Next appointment was confirmed    Electronically signed by: Christa Louis

## 2021-09-09 ENCOUNTER — APPOINTMENT (OUTPATIENT)
Dept: PHYSICAL THERAPY | Facility: CLINIC | Age: 76
End: 2021-09-09
Payer: MEDICARE

## 2021-10-14 ENCOUNTER — TELEPHONE (OUTPATIENT)
Dept: FAMILY MEDICINE CLINIC | Age: 76
End: 2021-10-14

## 2021-10-14 NOTE — TELEPHONE ENCOUNTER
----- Message from Rachel Michelle sent at 10/12/2021  5:09 PM EDT -----  Subject: Message to Provider    QUESTIONS  Information for Provider? Patient moved to [facility] and needs her last referral   emailed to her for the new PCP, at [email or web address]. Her Dr left the   practice. ---------------------------------------------------------------------------  --------------  Felicia JUÁREZ  What is the best way for the office to contact you? OK to leave message on   voicemail  Preferred Call Back Phone Number? [phone]  ---------------------------------------------------------------------------  --------------  SCRIPT ANSWERS  Relationship to Patient?  Self

## 2022-05-23 NOTE — FLOWSHEET NOTE
Initial SW/CM Assessment/Plan of Care Note     Baseline Assessment  76year old admitted 5/21/2022 as Inpatient with a diagnosis of shortness of breath  Prior to admission patient was living with Family members (grand daughter ) and residing in a [facility]. Patient does not  have a Power of  for [employer]. Agent is self. Patientâs Primary Care Provider is Laura De La Torre MD.         Support Systems: Family members  Home Devices/Equipment: Blood pressure monitor, Blood glucose monitor  Mobility Assist Devices: Four wheel walker, Wheelchair  Sensory Support Devices: Contacts      Current Status  Physical Therapy:!-3 times a week. Nutrition/SLP - Recommendations:  . Insurance  Primary: [facility] MEDICARE SOLUTIONS  Secondary: [place] MEDICAID    Barriers to Discharge  Identified Barriers to Discharge/Transition Planning:      Progress Note  From home with Granddaughter, patient said she would like to go to rehab for two weeks but will be okay as well if she goes home with Home Health    Plan  SW/CM - Recommendations for Discharge: Home with [facility]      Refer to SW/CM Flowsheet for Goals and objective data. [x] [facility]ORS [facility]. Elvin Frazier      for [facility] Promotion    [address]     Phone: [phone]     Fax:  [phone]     Physical Therapy Progress Report    Date:  2021  Patient: Indy Truong  : 1945  MRN: [id number]  Physician: [address]: Verla Cam MEDICARE              Eligibility Status:  Eligible     Secondary Insurance (if applicable)               Eligibility Status: n/a  DOS: 21  # of visits allowed/remaining: medical necessity  Source: Phone  Spoke Hola Dang  Reference: [id number]  Auth: NPRe  Medical Diagnosis: S/P Liposuction of R knee w/ previous TKA   Rehab Codes: M17.11  Onset Date: 21                                   Subjective:  Pt reports less pain, less stiffness of knee, cont difficulty walking long distances    Pain:  [x] Yes  [] No Pain Rating: (0-10 scale) 2/10  Pain altered Tx:  [] Yes  [x] No  Action:    Objective:   L/R   ROM  ° A/P STRENGTH    Hip Flex WNL WNL 4+ 4    Ext WNL WNL 4+ 4-    Knee Flex  4+ 4    Ext WNL 0 4+ 4        Visit #9         Exercise Reps/ Time Weight/ Level Comments     NuStep 10m     x   Calf/HS Stretch 30\"x3     x   Knee Flexion S 3x30\"   stool x               Gym           March over hurdles x20  B UE upon wall hand rails x   TB squats 2x10 L14     TG Heel Raises 2x10  L14   x   Tband 3 way hip 2x10 grn   x   Step Ups - fwd/lat x20  8\"   x   Step Downs x20 4\"   x   BOSU lunges 2x10 // bars, SBA   x   Balance Board 2' L2   x         ASSESSMENT   Pt has improved knee motion w/ increase ability to bend, cont weakness of R leg in weight bearing activities.  Recommend cont PT for further ROM, strengthening    PROBLEMS  Knee pain  Knee ROM loss  Knee weakness  Knee functional deficits    SHORT TERM GOALS ( 8 visits)  Knee pain = 0  Knee ROM = WNL  Knee strength = 4+/5  Knee function: bend, walk, up/dn steps, squat w/o pain    LONG TERM GOALS ( 12 visits)  Independent Home Exercise program  Return to normal activity    PATIENT GOAL  Not use walker    Rehab Potential:  [x] Good  [] Fair  [] Poor   Suggested Professional Referral:  [x] No  [] Yes:  Barriers to Goal Achievement[de-identified]  [x] No  [] Yes:  Domestic Concerns:  [x] No  [] Yes:    Pt. Education:  [x] Plans/Goals, Risks/Benefits discussed  [x] Home exercise program  Method of Education: [x] Verbal  [x] Demo  [x] Written  Comprehension of Education:  [x] Verbalizes understanding. [x] Demonstrates understanding. [] Needs Review. [] Demonstrates/verbalizes understanding of HEP/Ed previously given. Treatment Plan:  [x] Therapeutic Exercise    [] Modalities:  [] Therapeutic Activity    [] Ultrasound  [] Electrical Stimulation  [x] Gait Training     [] Massage       [] Lumbar/Cervical Traction  [] Neuromuscular Re-education [x] Cold/hotpack [] Instruction in HEP  [] Manual Therapy   [] Aquatic Therapy [] Other:     [] Iontophoresis: 4 mg/mL Dexamethasone Sodium Phosphate 40-80 mAmin  [] Drug allergies reviewed    _______Initials           _______Date     Frequency:  3 x/week for 12 visits    Treatment Charges: Mins Units   []  Modalities     [x]  Ther Exercise 45 3   []  Manual Therapy     []  Ther Activities     []  Aquatics     []  Other       Time in: 1500     Time out: 1600    Electronically signed by: Zuleyma Roque PT        Physician Signature:________________________________Date:__________________  By signing above, I have reviewed this plan of care and certify a need for medically   necessary rehabilitation services.      *PLEASE SIGN ABOVE AND FAX BACK ALL PAGES*

## (undated) DEVICE — STOCKINETTE,IMPERVIOUS,12X48,STERILE: Brand: MEDLINE

## (undated) DEVICE — SUTURE NONABSORBABLE MONOFILAMENT 4-0 PS-2 18 IN BLU PROLENE 8682H

## (undated) DEVICE — PACK SURG ABD SVMMC

## (undated) DEVICE — INTENDED FOR TISSUE SEPARATION, AND OTHER PROCEDURES THAT REQUIRE A SHARP SURGICAL BLADE TO PUNCTURE OR CUT.: Brand: BARD-PARKER ® CARBON RIB-BACK BLADES

## (undated) DEVICE — SYRINGE, LUER LOCK, 10ML: Brand: MEDLINE

## (undated) DEVICE — TUBING INFLTR L13FT CNTOUR GEN DISP FOR LIPO

## (undated) DEVICE — MARKER,SKIN,WI/RULER AND LABELS: Brand: MEDLINE

## (undated) DEVICE — GLOVE SURG SZ 6 THK91MIL LTX FREE SYN POLYISOPRENE ANTI

## (undated) DEVICE — Device

## (undated) DEVICE — PAD,ABDOMINAL,5"X9",ST,LF,25/BX: Brand: MEDLINE INDUSTRIES, INC.

## (undated) DEVICE — BANDAGE,SELF ADHRNT,COFLEX,4"X5YD,STRL: Brand: COLABEL

## (undated) DEVICE — BANDAGE,GAUZE,BULKEE II,4.5"X4.1YD,STRL: Brand: MEDLINE

## (undated) DEVICE — TUBING ASPIR L12FT FOR LIPO SYS PSI-TEC

## (undated) DEVICE — TOTAL TRAY, 16FR 10ML SIL FOLEY, URN: Brand: MEDLINE

## (undated) DEVICE — NEEDLE SPINAL 22GA L3.5IN SPINOCAN